# Patient Record
Sex: FEMALE | Race: WHITE | Employment: UNEMPLOYED | URBAN - METROPOLITAN AREA
[De-identification: names, ages, dates, MRNs, and addresses within clinical notes are randomized per-mention and may not be internally consistent; named-entity substitution may affect disease eponyms.]

---

## 2017-12-15 ENCOUNTER — OFFICE VISIT (OUTPATIENT)
Dept: SURGERY | Age: 22
End: 2017-12-15

## 2017-12-15 VITALS
BODY MASS INDEX: 45.99 KG/M2 | OXYGEN SATURATION: 98 % | HEIGHT: 67 IN | RESPIRATION RATE: 17 BRPM | DIASTOLIC BLOOD PRESSURE: 91 MMHG | TEMPERATURE: 97.5 F | SYSTOLIC BLOOD PRESSURE: 137 MMHG | HEART RATE: 97 BPM | WEIGHT: 293 LBS

## 2017-12-15 DIAGNOSIS — E66.01 MORBID OBESITY DUE TO EXCESS CALORIES (HCC): Primary | ICD-10-CM

## 2017-12-15 RX ORDER — PSEUDOEPHEDRINE HCL 30 MG
TABLET ORAL DAILY
COMMUNITY
End: 2021-12-06 | Stop reason: CLARIF

## 2017-12-15 RX ORDER — LITHIUM CARBONATE 300 MG
900 TABLET ORAL
Status: ON HOLD | COMMUNITY
End: 2021-12-10

## 2017-12-15 RX ORDER — METHOCARBAMOL 500 MG/1
500 TABLET, FILM COATED ORAL AS NEEDED
COMMUNITY
End: 2018-04-04

## 2017-12-15 RX ORDER — METFORMIN HYDROCHLORIDE 1000 MG/1
TABLET, FILM COATED, EXTENDED RELEASE ORAL
COMMUNITY
End: 2018-03-29

## 2017-12-15 NOTE — PROGRESS NOTES
Initial Consultation for Bariatric Surgery     Onelia Dash is a 20 y.o. female who comes into the office today for initial consultation for the surgical options for the treatment o f morbid obesity. 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 Ayaan Winter has tried a variety of unsupervised weight-loss attempts including unsupervised diets and Slim Fast, paleo, but has yet to meet with lasting success. Maximum weight lost on a diet is about 30 lbs, but that the weight always seems to return. Today, the patient is Height: 5' 7\" (170.2 cm) , Weight: 140.6 kg (310 lb) for a BMI of Body mass index is 48.55 kg/(m^2). [facility] Maximum weight is 335lbs. It is due to their severe obesity, which is further complicated by COCO (does not need a mask)  PCOS that the patient is now seeking out bariatric surgery. Weight Loss Metrics 12/15/2017 1/4/2017 10/19/2016 9/15/2016 5/13/2016 6/9/2014 5/8/2014   Today's Wt 310 lb 327 lb 6.1 oz 300 lb 312 lb 315 lb 295 lb 298 lb   BMI 48.55 kg/m2 51.28 kg/m2 46.99 kg/m2 48.87 kg/m2 49.32 kg/m2 46.19 kg/m2 46.66 kg/m2       Body mass index is 48.55 kg/(m^2).     Past Medical History:   Diagnosis Date    Chronic pain     Depression     GERD (gastroesophageal reflux disease)     Headache(784.0)     Hypercholesterolemia     IBS (irritable bowel syndrome)     Keratosis pilaris     Osteoarthrosis 4/25/2014    PCOS (polycystic ovarian syndrome)     Right bundle branch block     Sciatic nerve injury     Scoliosis     Sleep apnea     SVT (supraventricular tachycardia) (HCC)     Tailbone injury     Tricuspid valve disorder     Ruiz-Parkinson-White syndrome        Past Surgical History:   Procedure Laterality Date    HX ANGIOPLASTY  2002    HX HEART VALVE SURGERY  2000    oblation       Current Outpatient Prescriptions   Medication Sig Dispense Refill    metFORMIN (GLUMETZA) 1,000 mg TG24 24 hour tablet Take  by mouth.  methocarbamol (ROBAXIN) 500 mg tablet Take  by mouth four (4) times daily.  lithium carbonate 300 mg tablet Take 300 mg by mouth three (3) times daily.  calcium citrate tab tablet Take  by mouth daily.  LEVONORGESTREL-ETHIN ESTRADIOL (AVIANE PO) Take  by mouth.  ergocalciferol (ERGOCALCIFEROL) 50,000 unit capsule Take 1 Cap by mouth every seven (7) days. 4 Cap 3    lamoTRIgine (LAMICTAL) 200 mg tablet Take 200 mg by mouth daily.  clonazePAM (KLONOPIN) 0.5 mg tablet Take 0.5 mg by mouth once.  DULoxetine (CYMBALTA) 20 mg capsule Take 120 mg by mouth daily.  loratadine (CLARITIN) 10 mg tablet Take 1 Tab by mouth daily. 30 Tab 3       Allergies   Allergen Reactions    Abilify [Aripiprazole] Anaphylaxis    Dilaudid [Hydromorphone (Bulk)] Itching     Pt reports she became delusion after getting this med.     Hydrocodone Itching     All the hydrocodones      Other Medication Hives     Polyester     Roxicet [Oxycodone-Acetaminophen] Itching    Vicodin [Hydrocodone-Acetaminophen] Itching       Social History   Substance Use Topics    Smoking status: Never Smoker    Smokeless tobacco: Never Used    Alcohol use No       Family History   Problem Relation Age of Onset    Cancer Maternal Grandmother     Seizures Maternal Grandmother     Migraines Maternal Grandmother     Arthritis-osteo Maternal Grandmother     Arthritis-rheumatoid Maternal Grandmother     Thyroid Disease Maternal Grandmother     Lung Disease Maternal Grandmother     High Cholesterol Mother     Hypertension Mother     Elevated Lipids Mother     Heart Disease Father     Psychiatric Disorder Father     Hypertension Father     Diabetes Father     Cancer Paternal Grandmother        ROS, positive where in bold:    General: fevers, chills, night sweats, fatigue, weight loss, weight gain    GI: abdominal pain, nausea, vomiting, change in bowel habits, hematochezia, melena, GERD, constipation, IBS (diarrhea dominant)    Integumentary: dermatitis or abnormal moles.     HEENT:  visual changes, vertigo, epistaxis, dysphagia, hoarseness    Cardiac: chest pain, palpitations, hypertension, edema, varicosities    Resp:  cough, shortness of breath, wheezing, hemoptysis, snoring, reactive airway disease    : hematuria, dysuria, frequency, urgency, nocturia, stress urinary incontinence    MSK: weakness, joint pain, arthritis    Endocrine: diabetes, thyroid disease, polyuria, polydipsia, polyphagia, poor wound healing, heat intolerance, cold intolerance    Lymph/Heme: anemia, bruising, history of blood transfusions    Neuro: dizziness, headache, fainting, seizures, stroke    Psychiatry:  anxiety, depression and bipolar disorder, psychosis      Physical Exam:  Visit Vitals    BP (!) 137/91 (BP 1 Location: Right arm, BP Patient Position: Sitting)    Pulse 97    Temp 97.5 °F (36.4 °C) (Oral)    Resp 17    Ht 5' 7\" (1.702 m)    Wt 140.6 kg (310 lb)    SpO2 98%    BMI 48.55 kg/m2       General: Well developed, well nourished 20 y.o. female in no acute distress  ENMT: normocephalic, atraumatic mouth:clear, no overt lesions, oral mucosa pink and moist  Neck: supple, no masses, no adenopathy or carotid bruits, trachea midline  Skin: warm, smooth, dry and well perfused  Respiratory: clear to auscultation bilaterally, no wheeze, rhonchi or rales, excursions normal and symmetrical  Cardiovascular: Regular rate and rhythm, no murmurs, clicks, gallops or rubs, no edema or varicosities  Gastrointestinal: soft, nontender, nondistended, normoactive bowel sounds, no hernias, no hepatosplenomegaly, minimally palpable costal margins, mixed distribution  Musculoskeletal: warm, well-perfused, no tenderness or swelling, normal gait/station  Neuro: sensation and strength grossly intact and symmetrical  Psych: alert and oriented to person, place and time      Impression:    Shayla Medina is a 20 y.o. female who is suffering from morbid obesity and its attendant comorbidities who would benefit from bariatric surgery. We have had an extensive discussion with regard to the risks, benefits and likely outcomes of both the sleeve and the gastric bypass. With regard to bypass, we have discussed the risks including bleeding, infection, need for reoperation, damage to surrounding structure, anastomotic leak, gastrogastric fistula ulcer and stricture, bowel obstruction due to internal hernia or adhesions, DVT, PE, heart attack, stroke and death. Patient also understands risks of inadequate weight loss, excess weight loss, vitamin insufficiency, protein malnutrition, excess skin, and loss of hair. We've discussed the restrictive nature of the sleeve gastrectomy. The patient understands the likelihood of losing approximately 65% of their excess weight in 12 to 18 months. Patient also understands the risks including but not limited to bleeding, infection, need for reoperation, leaks from staple line and strictures, bowel obstruction secondary to adhesions, DVT, PE, heart attack, stroke, and death. Patient also understands risks of inadequate weight loss, excess weight loss, vitamin insufficiency, protein malnutrition, excess skin, and loss of hair. We have reviewed the components of a successful postoperative course including requirement for a high protein, low carbohydrate diet, 60 oz a day of zero calorie liquids, daily vitamin supplementation, daily exercise, regular follow-up, and participation in support groups. At this time we will enroll the patient in our bariatric program, undertake routine laboratory and radiographic evaluation, EKG, evaluation by nutritionist as well as psychologist and pending their satisfactory completion of the preop evaluation, plan to perform sleeve gastrectomy.

## 2017-12-15 NOTE — PATIENT INSTRUCTIONS
If you have any questions or concerns about today's appointment, the verbal and/or written instructions you were given for follow up care, please call our office at [phone].     [facility] - [address]    [phone] office  [phone]

## 2017-12-20 LAB
UREA BREATH TEST QL: NEGATIVE
UREA BREATH TEST QL: NORMAL

## 2017-12-21 ENCOUNTER — DOCUMENTATION ONLY (OUTPATIENT)
Dept: SURGERY | Age: 22
End: 2017-12-21

## 2017-12-21 NOTE — PROGRESS NOTES
[facility]  [address], [facility]    Patient's Name: Arben Bocanegra                                  Age: 20 y.o. YOB: 1995                                          Sex: female  Date: 12/20/2017  Insurance: [facility]                          Session: 1 of 3               Surgeon:  Dr. Leann Cortez    Height: 5'7\"                    Weight: 311#           Starting weight with surgeon: 310#          Do you smoke?: no    Alcohol Intake:   I do not drink at all:x      Class Guidelines    Pt. Understand that if they miss a month, depending on insurance, they may have to start over. Pt. Also understand that the expectation is to lose  Or maintain weight. Weight gain may result in delaying surgery until the weight is off. EATING HABITS AND BEHAVIORS:  Pt. Struggles With:  Liquid calories: x  Skipping breakfast:   Eating foods with a high amount of carbohydrates: x  Eating High fat foods:   Eating large portions:   Making poor snack choices:  Eating out frequently: x  Skipping meals:   Reading labels:   Drinking >48 oz fluids daily:   Getting sufficient physical activity/exercise: x  Night time eating/snacking:   Binge eating:   Eating often, even when not hungry (grazing):   Giving into peer pressure regarding eating/drinking:   Other:     Each class we spend time discussing the pre-op diet, diet progression and vitamin/mineral requirements as well as the Key Diet Principles. Each class we also cover lowering carbohydrate consumption. Keeping carbohydrates <25 grams per meal and avoiding added sugars is emphasized. Patient is educated on the effects that  carbohydrates and bad fats have on them. PHYSICAL ACTIVITY/EXERCISE:   Patient's activity is increasing because patient plans to or is:  Walking more: x  Other aerobic activity such as running, swimming, Irene, kickboxing ect.:   Chair exercises:    Active in resistance training such as weight lifting:   Other:     Each class we spend time discussing the importance of increasing activity. Patient can start with 10 minutes of walking daily or chair exercises and build from there. BEHAVIOR MODIFICATION:  Making modifications to behavior, patient plans to or is:  Eating only when hungry not to treat stress, anger, tiredness or boredom:   Eating away from TV, computer and phone:   Eating on a small plate: x  Eats slowly, chewing food well: x  Other: We spend time in each class discussing the importance of breaking bad habits and how to do this. I encourage pt.s to self evaluate and look for those crucial moments in which they are making these poor choices. I recommend a food journal which can help identify when/where//why/who we are with when we compromise and make exceptions that are poor choices. ADDITIONAL INFORMATION:  Specific changes patient made since the last class:  1st class. Pt. Plans to eat smaller portions, cut carbs, drink more water.     Shaun Adler, RD  12/20/2017

## 2018-01-24 ENCOUNTER — HOSPITAL ENCOUNTER (OUTPATIENT)
Dept: GENERAL RADIOLOGY | Age: 23
Discharge: HOME | End: 2018-01-24
Attending: SURGERY
Payer: COMMERCIAL

## 2018-01-24 DIAGNOSIS — E66.01 MORBID OBESITY DUE TO EXCESS CALORIES (HCC): ICD-10-CM

## 2018-01-24 PROCEDURE — 74011000255 HC RX REV CODE- 255: Performed by: SURGERY

## 2018-01-24 PROCEDURE — 74011000250 HC RX REV CODE- 250: Performed by: SURGERY

## 2018-01-24 PROCEDURE — 74247 XR UPPER GI W KUB AIR CONT: CPT

## 2018-01-24 RX ADMIN — BARIUM SULFATE 135 ML: 980 POWDER, FOR SUSPENSION ORAL at 12:45

## 2018-01-24 RX ADMIN — ANTACID/ANTIFLATULENT 4 G: 380; 550; 10; 10 GRANULE, EFFERVESCENT ORAL at 12:45

## 2018-01-24 RX ADMIN — BARIUM SULFATE 176 G: 960 POWDER, FOR SUSPENSION ORAL at 12:45

## 2018-01-31 ENCOUNTER — DOCUMENTATION ONLY (OUTPATIENT)
Dept: SURGERY | Age: 23
End: 2018-01-31

## 2018-01-31 NOTE — PROGRESS NOTES
[facility]  [address] [facility], [address]    Patient's Name: Alonzo Campo                                  Age: 20 y.o. YOB: 1995                                          Sex: female  Date: 01/31/2018  Insurance: [facility]                          Session: 2 of 3               Surgeon:  Dr. Kae Plasencia    Height: 5'7\"                    Weight: 307#           Starting weight with surgeon: 310#          Do you smoke?: no    Alcohol Intake:   I do not drink at all:x      Class Guidelines    Pt. Understand that if they miss a month, depending on insurance, they may have to start over. Pt. Also understand that the expectation is to lose  Or maintain weight. Weight gain may result in delaying surgery until the weight is off. EATING HABITS AND BEHAVIORS:  Pt. Struggles With:  Liquid calories:   Skipping breakfast:   Eating foods with a high amount of carbohydrates:   Eating High fat foods:   Eating large portions:   Making poor snack choices:  Eating out frequently:   Skipping meals:   Reading labels:   Drinking >48 oz fluids daily: x  Getting sufficient physical activity/exercise: x  Night time eating/snacking:   Binge eating:   Eating often, even when not hungry (grazing):   Giving into peer pressure regarding eating/drinking:   Other:     Each class we spend time discussing the pre-op diet, diet progression and vitamin/mineral requirements as well as the Key Diet Principles. Each class we also cover lowering carbohydrate consumption. Keeping carbohydrates <25 grams per meal and avoiding added sugars is emphasized. Patient is educated on the effects that  carbohydrates and bad fats have on them. PHYSICAL ACTIVITY/EXERCISE:   Patient's activity is increasing because patient plans to or is:  Walking more: Other aerobic activity such as running, swimming, Irene, kickboxing ect.:   Chair exercises:    Active in resistance training such as weight lifting:   Other:     Each class we spend time discussing the importance of increasing activity. Patient can start with 10 minutes of walking daily or chair exercises and build from there. BEHAVIOR MODIFICATION:  Making modifications to behavior, patient plans to or is:  Eating only when hungry not to treat stress, anger, tiredness or boredom: working on walking. Eating away from TV, computer and phone:   Eating on a small plate: x  Eats slowly, chewing food well: Other: We spend time in each class discussing the importance of breaking bad habits and how to do this. I encourage pt.s to self evaluate and look for those crucial moments in which they are making these poor choices. I recommend a food journal which can help identify when/where//why/who we are with when we compromise and make exceptions that are poor choices. ADDITIONAL INFORMATION:  Specific changes patient made since the last class:  Not eating at computer, drinking more water, cutting out caffeine.     Yvette Vega, RD  01/31/2018

## 2018-02-02 NOTE — PROGRESS NOTES
Given the degree of reflux and presence of hiatal hernia, I feel she may be best served with bypass. Please notify patient and I will discuss iwht her at our next appointment or sooner if she wishes.

## 2018-02-05 ENCOUNTER — TELEPHONE (OUTPATIENT)
Dept: SURGERY | Age: 23
End: 2018-02-05

## 2018-02-05 NOTE — TELEPHONE ENCOUNTER
----- Message from Law Cody MD sent at 2/2/2018  2:08 PM EST -----  Given the degree of reflux and presence of hiatal hernia, I feel she may be best served with bypass. Please notify patient and I will discuss iwht her at our next appointment or sooner if she wishes.

## 2018-02-05 NOTE — TELEPHONE ENCOUNTER
Patient return my call.  Inform her of results and patient also stated she will go today to have labs done prior to appt on Feb 8th

## 2018-02-05 NOTE — TELEPHONE ENCOUNTER
lvm for pt to return my call regarding her UGI results.  Patient has an appt to come and see Dr. Josephine Pappas on Feb 8th

## 2018-02-06 ENCOUNTER — HOSPITAL ENCOUNTER (OUTPATIENT)
Dept: PREADMISSION TESTING | Age: 23
Discharge: HOME | End: 2018-02-06
Payer: COMMERCIAL

## 2018-02-06 DIAGNOSIS — E66.01 MORBID OBESITY DUE TO EXCESS CALORIES (HCC): ICD-10-CM

## 2018-02-06 LAB
FERRITIN SERPL-MCNC: 62 NG/ML (ref 8–388)
FOLATE SERPL-MCNC: 19.4 NG/ML (ref 3.1–17.5)
IRON SERPL-MCNC: 43 UG/DL (ref 50–175)
VIT B12 SERPL-MCNC: 293 PG/ML (ref 211–911)

## 2018-02-06 PROCEDURE — 82728 ASSAY OF FERRITIN: CPT | Performed by: SURGERY

## 2018-02-06 PROCEDURE — 83540 ASSAY OF IRON: CPT | Performed by: SURGERY

## 2018-02-06 PROCEDURE — 36415 COLL VENOUS BLD VENIPUNCTURE: CPT | Performed by: SURGERY

## 2018-02-06 PROCEDURE — 82746 ASSAY OF FOLIC ACID SERUM: CPT | Performed by: SURGERY

## 2018-02-06 PROCEDURE — 84425 ASSAY OF VITAMIN B-1: CPT | Performed by: SURGERY

## 2018-02-08 ENCOUNTER — OFFICE VISIT (OUTPATIENT)
Dept: SURGERY | Age: 23
End: 2018-02-08

## 2018-02-08 VITALS
DIASTOLIC BLOOD PRESSURE: 95 MMHG | BODY MASS INDEX: 45.99 KG/M2 | RESPIRATION RATE: 18 BRPM | HEIGHT: 67 IN | HEART RATE: 108 BPM | OXYGEN SATURATION: 98 % | SYSTOLIC BLOOD PRESSURE: 136 MMHG | WEIGHT: 293 LBS | TEMPERATURE: 96 F

## 2018-02-08 DIAGNOSIS — E66.01 MORBID OBESITY DUE TO EXCESS CALORIES (HCC): Primary | ICD-10-CM

## 2018-02-08 LAB — VIT B1 BLD-SCNC: 175 NMOL/L (ref 66.5–200)

## 2018-02-08 NOTE — PATIENT INSTRUCTIONS
If you have any questions or concerns about today's appointment, the verbal and/or written instructions you were given for follow up care, please call our office at [phone].     [facility] - [address]    [phone] office  [phone]

## 2018-02-08 NOTE — PROGRESS NOTES
Bariatric Preoperative Progress note:    Subjective:     Heraclio Ang is a 20 y.o. female who presents today for followup of their candidacy for bariatric surgery. Since last seen, has been working on dietary classes. She is working to increase her exercise, but she notes that it causes pain in her legs. She is eating more lean protein and veggies. She is still struggling with finding dressing she finds palatable. Past Medical History:   Diagnosis Date    Chronic pain     Depression     GERD (gastroesophageal reflux disease)     Headache(784.0)     Hypercholesterolemia     IBS (irritable bowel syndrome)     Keratosis pilaris     Osteoarthrosis 4/25/2014    PCOS (polycystic ovarian syndrome)     Right bundle branch block     Sciatic nerve injury     Scoliosis     Sleep apnea     SVT [facility]supraventricular tachycardia) ([facility])     Tailbone injury     Tricuspid valve disorder     Ruiz-Parkinson-White syndrome        Past Surgical History:   Procedure Laterality Date    HX ANGIOPLASTY  2002    HX HEART VALVE SURGERY  2000    oblation       Current Outpatient Prescriptions   Medication Sig Dispense Refill    PEDIATRIC MULTIVITAMIN NO.49 (FLINTSTONES GUMMIES PO) Take  by mouth.  SPIRONOLACTONE PO Take  by mouth.  metFORMIN (GLUMETZA) 1,000 mg TG24 24 hour tablet Take  by mouth.  methocarbamol (ROBAXIN) 500 mg tablet Take  by mouth four (4) times daily.  lithium carbonate 300 mg tablet Take 300 mg by mouth three (3) times daily.  LEVONORGESTREL-ETHIN ESTRADIOL (AVIANE PO) Take  by mouth.  lamoTRIgine (LAMICTAL) 200 mg tablet Take 200 mg by mouth daily.  clonazePAM (KLONOPIN) 0.5 mg tablet Take 0.5 mg by mouth once.  DULoxetine (CYMBALTA) 20 mg capsule Take 120 mg by mouth daily.  calcium citrate tab tablet Take  by mouth daily.  ergocalciferol (ERGOCALCIFEROL) 50,000 unit capsule Take 1 Cap by mouth every seven (7) days.  4 Cap 3    loratadine (CLARITIN) 10 mg tablet Take 1 Tab by mouth daily. 30 Tab 3       Allergies   Allergen Reactions    Abilify [Aripiprazole] Anaphylaxis    Dilaudid [Hydromorphone (Bulk)] Itching     Pt reports she became delusion after getting this med.     Hydrocodone Itching     All the hydrocodones      Other Medication Hives     Polyester     Roxicet [Oxycodone-Acetaminophen] Itching    Vicodin [Hydrocodone-Acetaminophen] Itching         Objective:     Physical Exam:  Visit Vitals    BP (!) 136/95 (BP 1 Location: Right arm, BP Patient Position: Sitting)    Pulse (!) 108    Temp 96 °F (35.6 °C) (Oral)    Resp 18    Ht 5' 7\" (1.702 m)    Wt 140.8 kg (310 lb 6.4 oz)    LMP 02/07/2018    SpO2 98%    BMI 48.62 kg/m2       General: Well developed, well nourished 20 y.o. female in no acute distress  ENMT: normocephalic, atraumatic mouth:clear, no overt lesions, oral mucosa pink and moist  Neck: supple, no masses, no adenopathy or carotid bruits, trachea midline  Skin: warm, smooth, dry and well perfused  Respiratory: clear to auscultation bilaterally, no wheezes, rhonchi or rales, excursions normal and symmetrical  Cardiovascular: Regular rate and rhythm, no murmurs, clicks, gallops or rubs, no edema or varicosities  Gastrointestinal: soft, nontender, nondistended, normoactive bowel sounds, no hernias, no hepatosplenomegaly  Musculoskeletal: warm, well-perfused, no tenderness or swelling, normal gait/station  Neuro: sensation and strength grossly intact and symmetrical  Psych: alert and oriented to person, place and time      Studies to date:    Labs: significant for elevated cholesterol and LDL    Nutritional evaluation: ongoing    UGI: small hiatal hernia and GERD to upper esophagus    Psychiatric evaluation:pending repeat eval in March    Seen by cardiologist: she is at mildly increased risk for surgery, but stable for surgery      Assessment:   Onelia Meza is a 20 y.o. female who is progressing through the bariatric preoperative evaluation. At this time, they are an appropriate candidate for weight loss surgery. We have discussed worsening of reflux possibli with sleeve given her hital hernia and reflux and she has elected to have a gastric bypass instead. Plan:   -complete remainder of preop evaluation including remaining diet classes, labs and followup with Dr Richie Brown  She understands that she may require adjustments to her medication regimen as a result of changes in her hormones.   She is in good communication with her therapist regarding this.  -Follow up once has completed the above to determine next steps

## 2018-02-08 NOTE — PROGRESS NOTES
Leonora Meraz is a 20 y.o. female who presents for a surgical evaluation of gastric bypass. Body mass index is 48.62 kg/(m^2). 1. Have you been to the ER, urgent care clinic since your last visit? Hospitalized since your last visit? No    2. Have you seen or consulted any other health care providers outside of the [address] [place] [place] [address] since your last visit? Include any pap smears or colon screening.  No

## 2018-02-08 NOTE — MR AVS SNAPSHOT
Migdalia Head 
 
 
 [address] [facility] [address] [address] 
[phone] Patient: Luther Rojas MRN: [id number] :1995 Visit Information Date & Time Provider Department Dept. Phone Encounter #  
 2018 11:45 AM MD Lanette Harden [phone] [id number] Your Appointments 2018  3:00 PM  
NUTRITION COUNSELING with [facility] DIETICIAN Lanette Wilks ([address] [place] [address] Road) Appt Note: 3 of 3  
 [address] [facility] [address]  
  
   
 [address] [facility] Daniel De Gasperi [address] 3/19/2018  9:00 AM  
GASTRIC BYPASS VISIT with [facility] Jennifergiovanny Jackelin 92Sammi Lashaun ([address] [place], [place] [address]er Road) Appt Note: pre-op class sx 18 Dr. Jessika Verdugo [address] [facility] [address]  
  
   
 [id number] [place] [address] Daniel De Gasperi [address] 2018  3:15 PM  
POST OP with Saulo Wesley MD  
92Sammi Wilks [address] [place] [address] Road) Appt Note: post op sx 18  
 [address] [facility] [address] [place] [address] Kenzie Daniel De Gasperi [address]  
  
    
 2018  2:30 PM  
NUTRITION COUNSELING with [facility] DIETICIAN WaleSammi Wilks ([address] [place] [address]) Appt Note: post op nutrient sx 18  
 [address] [address] [address]  
[phone] Upcoming Health Maintenance Date Due  
 HPV AGE 9Y-34Y (1 of 3 - Female 3 Dose Series) 2005 DTaP/Tdap/Td series (1 - Tdap) 2015 PAP AKA CERVICAL CYTOLOGY 2015 Influenza Age 5 to Adult 2017 Allergies as of 2018  Review Complete On: 2018 By: Mamie Pradhan LPN Severity Noted Reaction Type Reactions Abilify [Aripiprazole] High 09/15/2016    Anaphylaxis Dilaudid [Hydromorphone (Bulk)]  09/15/2016    Itching Pt reports she became delusion after getting this med. Hydrocodone  01/30/2013    Itching All the hydrocodones Other Medication  04/17/2013    Hives Polyester Roxicet [Oxycodone-acetaminophen]  03/29/2013    Itching Vicodin [Hydrocodone-acetaminophen]  01/30/2013    Itching Current Immunizations  Never Reviewed Name Date Pneumococcal Conjugate (PCV-13) 1/4/2017  1:38 PM  
  
 Not reviewed this visit You Were Diagnosed With   
  
 Codes Comments Morbid obesity due to excess calories ([facility]    -  Primary ICD-10-CM: E66.01 
ICD-9-CM: 278.01 Vitals BP Pulse Temp Resp Height(growth percentile) Weight(growth percentile) (!) 136/95 (BP 1 Location: Right arm, BP Patient Position: Sitting) (!) 108 96 °F (35.6 °C) (Oral) 18 5' 7\" (1.702 m) 310 lb 6.4 oz (140.8 kg) LMP SpO2 BMI OB Status Smoking Status 02/07/2018 98% 48.62 kg/m2 Polycystic Ovarian Syndrome Never Smoker BMI and BSA Data Body Mass Index Body Surface Area  
 48.62 kg/m 2 2.58 m 2 Preferred Pharmacy Pharmacy Name Phone [address], [facility] [phone] Your Updated Medication List  
  
   
This list is accurate as of: 2/8/18  1:18 PM.  Always use your most recent med list.  
  
  
  
  
 Manus Shell Take  by mouth.  
  
 calcium citrate Tab tablet Take  by mouth daily. DULoxetine 20 mg capsule Commonly known as:  CYMBALTA Take 120 mg by mouth daily. ergocalciferol 50,000 unit capsule Commonly known as:  ERGOCALCIFEROL Take 1 Cap by mouth every seven (7) days. FLINTSTONES GUMMIES PO Take  by mouth. KlonoPIN 0.5 mg tablet Generic drug:  clonazePAM  
Take 0.5 mg by mouth once. LaMICtal 200 mg tablet Generic drug:  lamoTRIgine Take 200 mg by mouth daily. lithium carbonate 300 mg tablet Take 300 mg by mouth three (3) times daily. loratadine 10 mg tablet Commonly known as:  Jd Thanh Take 1 Tab by mouth daily. metFORMIN 1,000 mg Tg24 24 hour tablet Commonly known as:  Lopez Puff Take  by mouth. ROBAXIN 500 mg tablet Generic drug:  methocarbamol Take  by mouth four (4) times daily. SPIRONOLACTONE PO Take  by mouth. Patient Instructions If you have any questions or concerns about today's appointment, the verbal and/or written instructions you were given for follow up care, please call our office at [phone]. [facility] - [address], [address] 
 
[phone] office [phone] Introducing [facility]! Dear Derik Brice: 
Thank you for requesting a Coverity account. Our records indicate that you already have an active Coverity account. You can access your account anytime at [email or web address] Did you know that you can access your hospital and ER discharge instructions at any time in Coverity? You can also review all of your test results from your hospital stay or ER visit. Additional Information If you have questions, please visit the Frequently Asked Questions section of the Coverity website at [email or web address] Remember, Coverity is NOT to be used for urgent needs. For medical emergencies, dial 911. Now available from your iPhone and Android! Please provide this summary of care documentation to your next provider. Your primary care clinician is listed as Leda Bateman. If you have any questions after today's visit, please call [phone].

## 2018-02-21 ENCOUNTER — DOCUMENTATION ONLY (OUTPATIENT)
Dept: SURGERY | Age: 23
End: 2018-02-21

## 2018-02-22 ENCOUNTER — DOCUMENTATION ONLY (OUTPATIENT)
Dept: SURGERY | Age: 23
End: 2018-02-22

## 2018-02-22 NOTE — PROGRESS NOTES
[facility]  [address] [facility], [address]    Patient's Name: Suze Estrada                                  Age: 20 y.o. YOB: 1995                                          Sex: female  Date: 02/31/2018  Insurance: [facility]                          Session: 3 of 3               Surgeon:  Dr. Jonathan Mott    Height: 5'9\"                    Weight: 305#           Starting weight with surgeon: 310#          Do you smoke?: no    Alcohol Intake:   I do not drink at all:x      Class Guidelines    Pt. Understand that if they miss a month, depending on insurance, they may have to start over. Pt. Also understand that the expectation is to lose  Or maintain weight. Weight gain may result in delaying surgery until the weight is off. EATING HABITS AND BEHAVIORS:  Pt. Struggles With:  Liquid calories:   Skipping breakfast:   Eating foods with a high amount of carbohydrates: x  Eating High fat foods:   Eating large portions:   Making poor snack choices:  Eating out frequently:   Skipping meals:   Reading labels:   Drinking >48 oz fluids daily:   Getting sufficient physical activity/exercise: x  Night time eating/snacking:   Binge eating:   Eating often, even when not hungry (grazing):   Giving into peer pressure regarding eating/drinking:   Other:     Each class we spend time discussing the pre-op diet, diet progression and vitamin/mineral requirements as well as the Key Diet Principles. Each class we also cover lowering carbohydrate consumption. Keeping carbohydrates <25 grams per meal and avoiding added sugars is emphasized. Patient is educated on the effects that  carbohydrates and bad fats have on them.       PHYSICAL ACTIVITY/EXERCISE:   Patient's activity is increasing because patient plans to or is:  Walking more: x  Other aerobic activity such as running, swimming, Irene, kickboxing ect.:   Chair exercises: x  Active in resistance training such as weight lifting:   Other:     Each class we spend time discussing the importance of increasing activity. Patient can start with 10 minutes of walking daily or chair exercises and build from there. BEHAVIOR MODIFICATION:  Making modifications to behavior, patient plans to or is:  Eating only when hungry not to treat stress, anger, tiredness or boredom: x  Eating away from TV, computer and phone: x  Eating on a small plate: x  Eats slowly, chewing food well: x  Other: We spend time in each class discussing the importance of breaking bad habits and how to do this. I encourage pt.s to self evaluate and look for those crucial moments in which they are making these poor choices. I recommend a food journal which can help identify when/where//why/who we are with when we compromise and make exceptions that are poor choices. ADDITIONAL INFORMATION:  Specific changes patient made since the last class:  I cut out all liquid calories, doing more exercises, smaller portions, veggies over carbs! Better food choices. RD's thoughts:  Patient is doing well. She has a very supportive fiancee. She has many diet changed she is actively working on. Although I would have desired more weight loss, patient has faithfully attended classes and has been an active participant. She is cleared from a nutritional perspective.          Tiana Escobedo RD  02/21/2018

## 2018-03-06 ENCOUNTER — TELEPHONE (OUTPATIENT)
Dept: SURGERY | Age: 23
End: 2018-03-06

## 2018-03-06 NOTE — TELEPHONE ENCOUNTER
Spoke with patient in regards to labs per Lizzy's note she is to take iron supplement 60-65mg iron BID with Vitamin C for better absorption. Relayed information to patient, patient verbalized understanding with no further questions or concerns.

## 2018-03-19 ENCOUNTER — DOCUMENTATION ONLY (OUTPATIENT)
Dept: SURGERY | Age: 23
End: 2018-03-19

## 2018-03-19 NOTE — PROGRESS NOTES
Patient has been educated on the pre-op, post-op diets as well as the vitamin, mineral, protein supplements. She completed a quiz with accuracy.

## 2018-03-21 ENCOUNTER — OFFICE VISIT (OUTPATIENT)
Dept: SURGERY | Age: 23
End: 2018-03-21

## 2018-03-21 VITALS
SYSTOLIC BLOOD PRESSURE: 145 MMHG | WEIGHT: 293 LBS | RESPIRATION RATE: 16 BRPM | TEMPERATURE: 96.1 F | BODY MASS INDEX: 45.99 KG/M2 | DIASTOLIC BLOOD PRESSURE: 100 MMHG | OXYGEN SATURATION: 98 % | HEART RATE: 105 BPM | HEIGHT: 67 IN

## 2018-03-21 DIAGNOSIS — E66.01 MORBID OBESITY DUE TO EXCESS CALORIES (HCC): Primary | ICD-10-CM

## 2018-03-21 NOTE — PATIENT INSTRUCTIONS
If you have any questions or concerns about today's appointment, the verbal and/or written instructions you were given for follow up care, please call our office at [phone].     [facility] - [address]    [phone] office  [phone]

## 2018-03-21 NOTE — PROGRESS NOTES
Maite Trujillo is a 20 y.o. female who comes into the office today after completing the entirety of the bariatric preoperative protocol satisfactorily. 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 She has tried a variety of unsupervised weight-loss attempts, but has yet to meet with lasting success. Today, the patient is Height: 5' 7\" (170.2 cm) tall, Weight: 140.2 kg (309 lb) lbs for a Body mass index is 48.4 kg/(m^2). It is due to their severe obesity, which is further complicated by obstructive sleep apnea - clinical  that the patient is now seeking out bariatric surgery, specifically, the gastric bypass. Past Medical History:   Diagnosis Date    Chronic pain     Depression     GERD (gastroesophageal reflux disease)     Headache(784.0)     Hypercholesterolemia     IBS (irritable bowel syndrome)     Keratosis pilaris     Osteoarthrosis 4/25/2014    PCOS (polycystic ovarian syndrome)     Right bundle branch block     Sciatic nerve injury     Scoliosis     Sleep apnea     [facility])     Tailbone injury     Tricuspid valve disorder     Ruiz-Parkinson-White syndrome        Past Surgical History:   Procedure Laterality Date    HX ANGIOPLASTY  2002    HX HEART VALVE SURGERY  2000    oblation       Current Outpatient Prescriptions   Medication Sig Dispense Refill    PEDIATRIC MULTIVITAMIN NO.49 (FLINTSTONES GUMMIES PO) Take  by mouth.  SPIRONOLACTONE PO Take  by mouth.  metFORMIN (GLUMETZA) 1,000 mg TG24 24 hour tablet Take  by mouth.  methocarbamol (ROBAXIN) 500 mg tablet Take  by mouth four (4) times daily.  lithium carbonate 300 mg tablet Take 300 mg by mouth three (3) times daily.  calcium citrate tab tablet Take  by mouth daily.  LEVONORGESTREL-ETHIN ESTRADIOL (AVIANE PO) Take  by mouth.       ergocalciferol (ERGOCALCIFEROL) 50,000 unit capsule Take 1 Cap by mouth every seven (7) days. 4 Cap 3    lamoTRIgine (LAMICTAL) 200 mg tablet Take 200 mg by mouth daily.  clonazePAM (KLONOPIN) 0.5 mg tablet Take 0.5 mg by mouth once.  DULoxetine (CYMBALTA) 20 mg capsule Take 120 mg by mouth daily.  loratadine (CLARITIN) 10 mg tablet Take 1 Tab by mouth daily. 30 Tab 3       Allergies   Allergen Reactions    Abilify [Aripiprazole] Anaphylaxis    Dilaudid [Hydromorphone (Bulk)] Itching     Pt reports she became delusion after getting this med.     Hydrocodone Itching     All the hydrocodones      Other Medication Hives     Polyester     Roxicet [Oxycodone-Acetaminophen] Itching    Vicodin [Hydrocodone-Acetaminophen] Itching       Social History   Substance Use Topics    Smoking status: Never Smoker    Smokeless tobacco: Never Used    Alcohol use No       Family History   Problem Relation Age of Onset    Cancer Maternal Grandmother     Seizures Maternal Grandmother     Migraines Maternal Grandmother     Arthritis-osteo Maternal Grandmother     Arthritis-rheumatoid Maternal Grandmother     Thyroid Disease Maternal Grandmother     Lung Disease Maternal Grandmother     High Cholesterol Mother     Hypertension Mother     Elevated Lipids Mother     Heart Disease Father     Psychiatric Disorder Father     Hypertension Father     Diabetes Father     Cancer Paternal Grandmother          ROS, positive where in bold:    General: fevers, chills, night sweats, fatigue, weight loss, weight gain    GI: abdominal pain, nausea, vomiting, change in bowel habits, hematochezia, melena, GERD    Integumentary: dermatitis or abnormal moles    HEENT:  visual changes, vertigo, epistaxis, dysphagia, hoarseness    Cardiac: chest pain, palpitations, hypertension, edema,  varicosities    Resp:  cough, shortness of breath, wheezing, hemoptysis, snoring, reactive airway disease    : hematuria, dysuria, frequency, urgency, nocturia, stress urinary incontinence    MSK: weakness, joint pain, arthritis, sciatica    Endocrine: diabetes, thyroid disease, polyuria, polydipsia, polyphagia, poor wound healing, heat intolerance, cold intolerance    Lymph/Heme: anemia, bruising, history of blood transfusions    Neuro: dizziness, headache, fainting, seizures, stroke    Psychiatry:  Anxiety, depression, bipolar d/o, borderline personality disorder psychosis      Physical Exam:  Visit Vitals    BP (!) 145/100 (BP 1 Location: Right arm, BP Patient Position: Sitting)    Pulse (!) 105    Temp 96.1 °F (35.6 °C) (Oral)    Resp 16    Ht 5' 7\" (1.702 m)    Wt 140.2 kg (309 lb)    SpO2 98%    BMI 48.4 kg/m2       General: Well developed, well nourished 20 y.o. female in no acute distress  ENMT: normocephalic, atraumatic mouth:clear, no overt lesions, oral mucosa pink and moist  Neck: supple, no masses, no adenopathy or carotid bruits, trachea midline  Skin: warm, smooth, dry and well perfused  Respiratory: clear to auscultation bilaterally, no wheeze, rhonchi or rales, excursions normal and symmetrical  Cardiovascular: Regular rate and rhythm, no murmurs, clicks, gallops or rubs, no edema or varicosities  Gastrointestinal: soft, nontender, nondistended, normoactive bowel sounds, no hernias, no hepatosplenomegaly, easily palpable costal margins,  mixed distribution  Musculoskeletal: warm, well-perfused, no tenderness or swelling, normal gait/station  Neuro: sensation and strength grossly intact and symmetrical  Psych: alert and oriented to person, place and time      Studies:    Labs: within normal limits except for elevated cholesterol, low Vit D (supplemented)    EKG: without significant abnormalities    Nutritional evaluation has deemed a good candidate for weight loss surgery    Psychiatric evaluation has deemed a good candidate for weight loss surgery    Impression:    Tyrell Steiner is a 20 y.o. female who is suffering from morbid obesity and its attendant comorbidities who would benefit from bariatric surgery. We've discussed the restrictive and malabsorptive nature of the gastric bypass. The patient understands the likelihood of losing approximately 80% of their excess weight in 12 to 18 months. The patient also understands the risks including but not limited to bleeding, infection, need for reoperation, anastomotic ulcers, leaks and strictures, bowel obstruction secondary to adhesions and internal hernias, DVT, PE, heart attack, stroke, and death. Patient also understands risks of inadequate weight loss, excess weight loss, vitamin insufficiency, protein malnutrition, excess skin, and loss of hair. We have reviewed the components of a successful postoperative course including requirement for a high protein, low carbohydrate diet, 60 oz a day of zero calorie liquids, daily vitamin supplementation, daily exercise, regular follow-up, and participation in support groups. We have reviewed the preoperative clear liquid diet, as well as reviewed any medication changes required while on the clear liquid diet. We will schedule them for laparoscopic gastric bypass in the near future.

## 2018-03-21 NOTE — PROGRESS NOTES
Patient presents today for pre-op visit. Doing well and has no concerns. 1. Have you been to the ER, urgent care clinic since your last visit? Hospitalized since your last visit? No    2. Have you seen or consulted any other health care providers outside of the [address] [place] [address] since your last visit? Include any pap smears or colon screening.  No

## 2018-03-29 RX ORDER — SPIRONOLACTONE 50 MG/1
50 TABLET, FILM COATED ORAL DAILY
COMMUNITY

## 2018-03-29 RX ORDER — MELATONIN
1000 DAILY
COMMUNITY
End: 2021-12-06 | Stop reason: CLARIF

## 2018-03-29 RX ORDER — DULOXETIN HYDROCHLORIDE 60 MG/1
120 CAPSULE, DELAYED RELEASE ORAL DAILY
COMMUNITY
End: 2021-12-06 | Stop reason: CLARIF

## 2018-04-02 ENCOUNTER — ANESTHESIA EVENT (OUTPATIENT)
Dept: SURGERY | Age: 23
DRG: 621 | End: 2018-04-02
Payer: COMMERCIAL

## 2018-04-03 ENCOUNTER — HOSPITAL ENCOUNTER (INPATIENT)
Age: 23
LOS: 1 days | Discharge: HOME | DRG: 621 | End: 2018-04-04
Attending: SURGERY | Admitting: SURGERY
Payer: COMMERCIAL

## 2018-04-03 ENCOUNTER — ANESTHESIA (OUTPATIENT)
Dept: SURGERY | Age: 23
DRG: 621 | End: 2018-04-03
Payer: COMMERCIAL

## 2018-04-03 DIAGNOSIS — G89.18 POSTOPERATIVE PAIN: Primary | ICD-10-CM

## 2018-04-03 PROBLEM — E66.01 MORBID OBESITY DUE TO EXCESS CALORIES (HCC): Status: ACTIVE | Noted: 2018-04-03

## 2018-04-03 LAB — HCG UR QL: NEGATIVE

## 2018-04-03 PROCEDURE — 77030018846 HC SOL IRR STRL H20 ICUM -A: Performed by: SURGERY

## 2018-04-03 PROCEDURE — 74011250636 HC RX REV CODE- 250/636

## 2018-04-03 PROCEDURE — 77030035048 HC TRCR ENDOSC OPTCL COVD -B: Performed by: SURGERY

## 2018-04-03 PROCEDURE — 77030036598 HC CARTDRG STPL RELD ECHELON FLX J&J -D: Performed by: SURGERY

## 2018-04-03 PROCEDURE — 77030002933 HC SUT MCRYL J&J -A: Performed by: SURGERY

## 2018-04-03 PROCEDURE — 77030016151 HC PROTCTR LNS DFOG COVD -B: Performed by: SURGERY

## 2018-04-03 PROCEDURE — 77030011640 HC PAD GRND REM COVD -A: Performed by: SURGERY

## 2018-04-03 PROCEDURE — 74011250637 HC RX REV CODE- 250/637: Performed by: ANESTHESIOLOGY

## 2018-04-03 PROCEDURE — 77030013079 HC BLNKT BAIR HGGR 3M -A: Performed by: ANESTHESIOLOGY

## 2018-04-03 PROCEDURE — 77030032490 HC SLV COMPR SCD KNE COVD -B: Performed by: SURGERY

## 2018-04-03 PROCEDURE — 0D164ZA BYPASS STOMACH TO JEJUNUM, PERCUTANEOUS ENDOSCOPIC APPROACH: ICD-10-PCS | Performed by: SURGERY

## 2018-04-03 PROCEDURE — 77030036732 HC RELD STPLR VASC J&J -F: Performed by: SURGERY

## 2018-04-03 PROCEDURE — 74011250636 HC RX REV CODE- 250/636: Performed by: SURGERY

## 2018-04-03 PROCEDURE — 77030002996 HC SUT SLK J&J -A: Performed by: SURGERY

## 2018-04-03 PROCEDURE — 77030027138 HC INCENT SPIROMETER -A

## 2018-04-03 PROCEDURE — 77030035051: Performed by: SURGERY

## 2018-04-03 PROCEDURE — 77030031139 HC SUT VCRL2 J&J -A: Performed by: SURGERY

## 2018-04-03 PROCEDURE — 77030008438 HC STPL REINF SEMGD WLGO -D: Performed by: SURGERY

## 2018-04-03 PROCEDURE — 65270000029 HC RM PRIVATE

## 2018-04-03 PROCEDURE — 77030027491 HC SHR ENDOSC COVD -B: Performed by: SURGERY

## 2018-04-03 PROCEDURE — 77030010507 HC ADH SKN DERMBND J&J -B: Performed by: SURGERY

## 2018-04-03 PROCEDURE — 77030035029 HC NDL INSUF VERES DISP COVD -B: Performed by: SURGERY

## 2018-04-03 PROCEDURE — 76210000016 HC OR PH I REC 1 TO 1.5 HR: Performed by: SURGERY

## 2018-04-03 PROCEDURE — 77030008477 HC STYL SATN SLP COVD -A: Performed by: ANESTHESIOLOGY

## 2018-04-03 PROCEDURE — 74011250637 HC RX REV CODE- 250/637: Performed by: SURGERY

## 2018-04-03 PROCEDURE — 77030035050: Performed by: SURGERY

## 2018-04-03 PROCEDURE — 77030035045 HC TRCR ENDOSC VRSPRT BLDLSS COVD -B: Performed by: SURGERY

## 2018-04-03 PROCEDURE — 74011000250 HC RX REV CODE- 250

## 2018-04-03 PROCEDURE — 77030027876 HC STPLR ENDOSC FLX PWR J&J -G1: Performed by: SURGERY

## 2018-04-03 PROCEDURE — 77030008683 HC TU ET CUF COVD -A: Performed by: ANESTHESIOLOGY

## 2018-04-03 PROCEDURE — 76060000036 HC ANESTHESIA 2.5 TO 3 HR: Performed by: SURGERY

## 2018-04-03 PROCEDURE — 74011000250 HC RX REV CODE- 250: Performed by: SURGERY

## 2018-04-03 PROCEDURE — 74011258636 HC RX REV CODE- 258/636: Performed by: SURGERY

## 2018-04-03 PROCEDURE — 81025 URINE PREGNANCY TEST: CPT

## 2018-04-03 PROCEDURE — 77030005518 HC CATH URETH FOL 2W BARD -B: Performed by: SURGERY

## 2018-04-03 PROCEDURE — 77030020254 HC SOL INJ D5LR LACTATED RINGER

## 2018-04-03 PROCEDURE — 76010000132 HC OR TIME 2.5 TO 3 HR: Performed by: SURGERY

## 2018-04-03 RX ORDER — ONDANSETRON 2 MG/ML
4 INJECTION INTRAMUSCULAR; INTRAVENOUS
Status: DISCONTINUED | OUTPATIENT
Start: 2018-04-03 | End: 2018-04-03 | Stop reason: HOSPADM

## 2018-04-03 RX ORDER — SODIUM CHLORIDE, SODIUM LACTATE, POTASSIUM CHLORIDE, CALCIUM CHLORIDE 600; 310; 30; 20 MG/100ML; MG/100ML; MG/100ML; MG/100ML
75 INJECTION, SOLUTION INTRAVENOUS CONTINUOUS
Status: DISCONTINUED | OUTPATIENT
Start: 2018-04-03 | End: 2018-04-04 | Stop reason: HOSPADM

## 2018-04-03 RX ORDER — FAMOTIDINE 20 MG/1
TABLET, FILM COATED ORAL
Status: DISPENSED
Start: 2018-04-03 | End: 2018-04-03

## 2018-04-03 RX ORDER — DEXTROSE, SODIUM CHLORIDE, SODIUM LACTATE, POTASSIUM CHLORIDE, AND CALCIUM CHLORIDE 5; .6; .31; .03; .02 G/100ML; G/100ML; G/100ML; G/100ML; G/100ML
150 INJECTION, SOLUTION INTRAVENOUS CONTINUOUS
Status: DISCONTINUED | OUTPATIENT
Start: 2018-04-03 | End: 2018-04-04 | Stop reason: HOSPADM

## 2018-04-03 RX ORDER — MIDAZOLAM HYDROCHLORIDE 1 MG/ML
INJECTION, SOLUTION INTRAMUSCULAR; INTRAVENOUS AS NEEDED
Status: DISCONTINUED | OUTPATIENT
Start: 2018-04-03 | End: 2018-04-03 | Stop reason: HOSPADM

## 2018-04-03 RX ORDER — DEXAMETHASONE SODIUM PHOSPHATE 4 MG/ML
INJECTION, SOLUTION INTRA-ARTICULAR; INTRALESIONAL; INTRAMUSCULAR; INTRAVENOUS; SOFT TISSUE AS NEEDED
Status: DISCONTINUED | OUTPATIENT
Start: 2018-04-03 | End: 2018-04-03 | Stop reason: HOSPADM

## 2018-04-03 RX ORDER — MORPHINE SULFATE 2 MG/ML
2 INJECTION, SOLUTION INTRAMUSCULAR; INTRAVENOUS
Status: DISCONTINUED | OUTPATIENT
Start: 2018-04-03 | End: 2018-04-04 | Stop reason: HOSPADM

## 2018-04-03 RX ORDER — LIDOCAINE HYDROCHLORIDE 20 MG/ML
INJECTION, SOLUTION EPIDURAL; INFILTRATION; INTRACAUDAL; PERINEURAL AS NEEDED
Status: DISCONTINUED | OUTPATIENT
Start: 2018-04-03 | End: 2018-04-03 | Stop reason: HOSPADM

## 2018-04-03 RX ORDER — NEOSTIGMINE METHYLSULFATE 5 MG/5 ML
SYRINGE (ML) INTRAVENOUS AS NEEDED
Status: DISCONTINUED | OUTPATIENT
Start: 2018-04-03 | End: 2018-04-03 | Stop reason: HOSPADM

## 2018-04-03 RX ORDER — KETOROLAC TROMETHAMINE 30 MG/ML
30 INJECTION, SOLUTION INTRAMUSCULAR; INTRAVENOUS EVERY 6 HOURS
Status: COMPLETED | OUTPATIENT
Start: 2018-04-03 | End: 2018-04-04

## 2018-04-03 RX ORDER — FENTANYL CITRATE 50 UG/ML
INJECTION, SOLUTION INTRAMUSCULAR; INTRAVENOUS AS NEEDED
Status: DISCONTINUED | OUTPATIENT
Start: 2018-04-03 | End: 2018-04-03 | Stop reason: HOSPADM

## 2018-04-03 RX ORDER — ONDANSETRON 2 MG/ML
4 INJECTION INTRAMUSCULAR; INTRAVENOUS
Status: DISCONTINUED | OUTPATIENT
Start: 2018-04-03 | End: 2018-04-04 | Stop reason: HOSPADM

## 2018-04-03 RX ORDER — ENOXAPARIN SODIUM 100 MG/ML
40 INJECTION SUBCUTANEOUS DAILY
Status: DISCONTINUED | OUTPATIENT
Start: 2018-04-04 | End: 2018-04-04 | Stop reason: HOSPADM

## 2018-04-03 RX ORDER — FAMOTIDINE 20 MG/1
20 TABLET, FILM COATED ORAL 2 TIMES DAILY
Status: DISCONTINUED | OUTPATIENT
Start: 2018-04-03 | End: 2018-04-03

## 2018-04-03 RX ORDER — SODIUM CHLORIDE, SODIUM LACTATE, POTASSIUM CHLORIDE, CALCIUM CHLORIDE 600; 310; 30; 20 MG/100ML; MG/100ML; MG/100ML; MG/100ML
50 INJECTION, SOLUTION INTRAVENOUS CONTINUOUS
Status: DISCONTINUED | OUTPATIENT
Start: 2018-04-03 | End: 2018-04-03 | Stop reason: HOSPADM

## 2018-04-03 RX ORDER — NALBUPHINE HYDROCHLORIDE 10 MG/ML
10 INJECTION, SOLUTION INTRAMUSCULAR; INTRAVENOUS; SUBCUTANEOUS
Status: DISCONTINUED | OUTPATIENT
Start: 2018-04-03 | End: 2018-04-03 | Stop reason: HOSPADM

## 2018-04-03 RX ORDER — VECURONIUM BROMIDE FOR INJECTION 1 MG/ML
INJECTION, POWDER, LYOPHILIZED, FOR SOLUTION INTRAVENOUS AS NEEDED
Status: DISCONTINUED | OUTPATIENT
Start: 2018-04-03 | End: 2018-04-03 | Stop reason: HOSPADM

## 2018-04-03 RX ORDER — ONDANSETRON 2 MG/ML
INJECTION INTRAMUSCULAR; INTRAVENOUS AS NEEDED
Status: DISCONTINUED | OUTPATIENT
Start: 2018-04-03 | End: 2018-04-03 | Stop reason: HOSPADM

## 2018-04-03 RX ORDER — OXYCODONE HYDROCHLORIDE 5 MG/1
5-10 TABLET ORAL
Status: DISCONTINUED | OUTPATIENT
Start: 2018-04-03 | End: 2018-04-04 | Stop reason: HOSPADM

## 2018-04-03 RX ORDER — GLYCOPYRROLATE 0.2 MG/ML
INJECTION INTRAMUSCULAR; INTRAVENOUS AS NEEDED
Status: DISCONTINUED | OUTPATIENT
Start: 2018-04-03 | End: 2018-04-03 | Stop reason: HOSPADM

## 2018-04-03 RX ORDER — CLONAZEPAM 0.5 MG/1
0.5 TABLET ORAL
Status: DISCONTINUED | OUTPATIENT
Start: 2018-04-03 | End: 2018-04-04 | Stop reason: HOSPADM

## 2018-04-03 RX ORDER — DULOXETIN HYDROCHLORIDE 60 MG/1
120 CAPSULE, DELAYED RELEASE ORAL DAILY
Status: DISCONTINUED | OUTPATIENT
Start: 2018-04-04 | End: 2018-04-04 | Stop reason: HOSPADM

## 2018-04-03 RX ORDER — LAMOTRIGINE 100 MG/1
400 TABLET ORAL DAILY
Status: DISCONTINUED | OUTPATIENT
Start: 2018-04-04 | End: 2018-04-04 | Stop reason: HOSPADM

## 2018-04-03 RX ORDER — FENTANYL CITRATE 50 UG/ML
25 INJECTION, SOLUTION INTRAMUSCULAR; INTRAVENOUS AS NEEDED
Status: DISCONTINUED | OUTPATIENT
Start: 2018-04-03 | End: 2018-04-03 | Stop reason: HOSPADM

## 2018-04-03 RX ORDER — SUCCINYLCHOLINE CHLORIDE 20 MG/ML
INJECTION INTRAMUSCULAR; INTRAVENOUS AS NEEDED
Status: DISCONTINUED | OUTPATIENT
Start: 2018-04-03 | End: 2018-04-03 | Stop reason: HOSPADM

## 2018-04-03 RX ORDER — LITHIUM CARBONATE 300 MG
900 TABLET ORAL
Status: DISCONTINUED | OUTPATIENT
Start: 2018-04-03 | End: 2018-04-04 | Stop reason: HOSPADM

## 2018-04-03 RX ORDER — ENOXAPARIN SODIUM 100 MG/ML
40 INJECTION SUBCUTANEOUS
Status: COMPLETED | OUTPATIENT
Start: 2018-04-03 | End: 2018-04-03

## 2018-04-03 RX ORDER — DIPHENHYDRAMINE HYDROCHLORIDE 50 MG/ML
25 INJECTION, SOLUTION INTRAMUSCULAR; INTRAVENOUS
Status: DISCONTINUED | OUTPATIENT
Start: 2018-04-03 | End: 2018-04-04 | Stop reason: HOSPADM

## 2018-04-03 RX ORDER — ACETAMINOPHEN 325 MG/1
650 TABLET ORAL
Status: DISCONTINUED | OUTPATIENT
Start: 2018-04-03 | End: 2018-04-04 | Stop reason: HOSPADM

## 2018-04-03 RX ORDER — PROPOFOL 10 MG/ML
INJECTION, EMULSION INTRAVENOUS AS NEEDED
Status: DISCONTINUED | OUTPATIENT
Start: 2018-04-03 | End: 2018-04-03 | Stop reason: HOSPADM

## 2018-04-03 RX ORDER — HYOSCYAMINE SULFATE 0.12 MG/1
0.12 TABLET, ORALLY DISINTEGRATING ORAL
Status: DISCONTINUED | OUTPATIENT
Start: 2018-04-03 | End: 2018-04-04 | Stop reason: HOSPADM

## 2018-04-03 RX ORDER — SCOLOPAMINE TRANSDERMAL SYSTEM 1 MG/1
1 PATCH, EXTENDED RELEASE TRANSDERMAL
Status: DISCONTINUED | OUTPATIENT
Start: 2018-04-03 | End: 2018-04-04 | Stop reason: HOSPADM

## 2018-04-03 RX ORDER — BUPIVACAINE HYDROCHLORIDE AND EPINEPHRINE 5; 5 MG/ML; UG/ML
50 INJECTION, SOLUTION EPIDURAL; INTRACAUDAL; PERINEURAL ONCE
Status: DISPENSED | OUTPATIENT
Start: 2018-04-03 | End: 2018-04-03

## 2018-04-03 RX ADMIN — FENTANYL CITRATE 100 MCG: 50 INJECTION, SOLUTION INTRAMUSCULAR; INTRAVENOUS at 09:12

## 2018-04-03 RX ADMIN — LITHIUM CARBONATE 900 MG: 300 TABLET ORAL at 22:54

## 2018-04-03 RX ADMIN — SUCCINYLCHOLINE CHLORIDE 100 MG: 20 INJECTION INTRAMUSCULAR; INTRAVENOUS at 09:12

## 2018-04-03 RX ADMIN — GLYCOPYRROLATE 0.4 MG: 0.2 INJECTION INTRAMUSCULAR; INTRAVENOUS at 11:34

## 2018-04-03 RX ADMIN — VECURONIUM BROMIDE FOR INJECTION 1 MG: 1 INJECTION, POWDER, LYOPHILIZED, FOR SOLUTION INTRAVENOUS at 10:55

## 2018-04-03 RX ADMIN — PROPOFOL 200 MG: 10 INJECTION, EMULSION INTRAVENOUS at 09:12

## 2018-04-03 RX ADMIN — VECURONIUM BROMIDE FOR INJECTION 1 MG: 1 INJECTION, POWDER, LYOPHILIZED, FOR SOLUTION INTRAVENOUS at 11:17

## 2018-04-03 RX ADMIN — ONDANSETRON 4 MG: 2 INJECTION INTRAMUSCULAR; INTRAVENOUS at 11:08

## 2018-04-03 RX ADMIN — FENTANYL CITRATE 50 MCG: 50 INJECTION, SOLUTION INTRAMUSCULAR; INTRAVENOUS at 11:37

## 2018-04-03 RX ADMIN — MIDAZOLAM HYDROCHLORIDE 2 MG: 1 INJECTION, SOLUTION INTRAMUSCULAR; INTRAVENOUS at 09:08

## 2018-04-03 RX ADMIN — FENTANYL CITRATE 50 MCG: 50 INJECTION, SOLUTION INTRAMUSCULAR; INTRAVENOUS at 11:08

## 2018-04-03 RX ADMIN — ACETAMINOPHEN 650 MG: 325 TABLET, FILM COATED ORAL at 20:47

## 2018-04-03 RX ADMIN — SODIUM CHLORIDE, SODIUM LACTATE, POTASSIUM CHLORIDE, CALCIUM CHLORIDE, AND DEXTROSE MONOHYDRATE 150 ML/HR: 600; 310; 30; 20; 5 INJECTION, SOLUTION INTRAVENOUS at 20:47

## 2018-04-03 RX ADMIN — VECURONIUM BROMIDE FOR INJECTION 5 MG: 1 INJECTION, POWDER, LYOPHILIZED, FOR SOLUTION INTRAVENOUS at 09:31

## 2018-04-03 RX ADMIN — VECURONIUM BROMIDE FOR INJECTION 2 MG: 1 INJECTION, POWDER, LYOPHILIZED, FOR SOLUTION INTRAVENOUS at 10:00

## 2018-04-03 RX ADMIN — LIDOCAINE HYDROCHLORIDE 100 MG: 20 INJECTION, SOLUTION EPIDURAL; INFILTRATION; INTRACAUDAL; PERINEURAL at 09:12

## 2018-04-03 RX ADMIN — DEXAMETHASONE SODIUM PHOSPHATE 4 MG: 4 INJECTION, SOLUTION INTRA-ARTICULAR; INTRALESIONAL; INTRAMUSCULAR; INTRAVENOUS; SOFT TISSUE at 09:34

## 2018-04-03 RX ADMIN — VECURONIUM BROMIDE FOR INJECTION 2 MG: 1 INJECTION, POWDER, LYOPHILIZED, FOR SOLUTION INTRAVENOUS at 10:11

## 2018-04-03 RX ADMIN — SODIUM CHLORIDE, SODIUM LACTATE, POTASSIUM CHLORIDE, AND CALCIUM CHLORIDE 75 ML/HR: 600; 310; 30; 20 INJECTION, SOLUTION INTRAVENOUS at 07:20

## 2018-04-03 RX ADMIN — SODIUM CHLORIDE, SODIUM LACTATE, POTASSIUM CHLORIDE, CALCIUM CHLORIDE, AND DEXTROSE MONOHYDRATE 150 ML/HR: 600; 310; 30; 20; 5 INJECTION, SOLUTION INTRAVENOUS at 15:49

## 2018-04-03 RX ADMIN — ENOXAPARIN SODIUM 40 MG: 40 INJECTION SUBCUTANEOUS at 07:19

## 2018-04-03 RX ADMIN — ACETAMINOPHEN 650 MG: 325 TABLET, FILM COATED ORAL at 14:29

## 2018-04-03 RX ADMIN — OXYCODONE HYDROCHLORIDE 10 MG: 5 TABLET ORAL at 15:47

## 2018-04-03 RX ADMIN — KETOROLAC TROMETHAMINE 30 MG: 30 INJECTION, SOLUTION INTRAMUSCULAR at 18:25

## 2018-04-03 RX ADMIN — CEFAZOLIN 3 G: 1 INJECTION, POWDER, FOR SOLUTION INTRAMUSCULAR; INTRAVENOUS; PARENTERAL at 09:14

## 2018-04-03 RX ADMIN — KETOROLAC TROMETHAMINE 30 MG: 30 INJECTION, SOLUTION INTRAMUSCULAR at 12:39

## 2018-04-03 RX ADMIN — Medication 4 MG: at 11:34

## 2018-04-03 NOTE — PERIOP NOTES
1152 Pt received to PACU and connected to monitor. Vital signs stable. Nurse at bedside. Will continue to monitor. [address] [facility] Dr. Isaac Hairston at bedside to asses pt status and verify Allergies/MAR.

## 2018-04-03 NOTE — PERIOP NOTES
TRANSFER - OUT REPORT:    Verbal report given to ANAYA Dowling on Sean Marley  being transferred to 2200 (unit) for routine post - op       Report consisted of patients Situation, Background, Assessment and   Recommendations(SBAR). Information from the following report(s) SBAR, Kardex and MAR was reviewed with the receiving nurse. Lines:   Peripheral IV 04/03/18 Left Arm (Active)   Site Assessment Clean, dry, & intact 4/3/2018 11:52 AM   Phlebitis Assessment 0 4/3/2018 11:52 AM   Infiltration Assessment 0 4/3/2018 11:52 AM   Dressing Status Clean, dry, & intact 4/3/2018 11:52 AM   Dressing Type Transparent;Tape 4/3/2018 11:52 AM   Hub Color/Line Status Pink; Infusing 4/3/2018 11:52 AM        Opportunity for questions and clarification was provided.       Patient transported with:   [employer]

## 2018-04-03 NOTE — PROGRESS NOTES
conducted a pre-surgery visit with Joshua Lee, who is a 22 y.o.,female. The  provided the following Interventions:  Initiated a relationship of care and support. Offered prayer and assurance of continued prayers on patient's behalf. Plan:  Chaplains will continue to follow and will provide pastoral care on an as needed/requested basis.  recommends bedside caregivers page  on duty if patient shows signs of acute spiritual or emotional distress.     [facility]   Spiritual Care   [phone]

## 2018-04-03 NOTE — ANESTHESIA PREPROCEDURE EVALUATION
Anesthetic History   No history of anesthetic complications            Review of Systems / Medical History  Patient summary reviewed and pertinent labs reviewed    Pulmonary        Sleep apnea: No treatment           Neuro/Psych         Psychiatric history     Cardiovascular            Dysrhythmias       Exercise tolerance: >4 METS     GI/Hepatic/Renal     GERD: well controlled           Endo/Other        Morbid obesity and arthritis     Other Findings   Comments: Documentation of current medication  Current medications obtained, documented and obtained? YES      Risk Factors for Postoperative nausea/vomiting:       History of postoperative nausea/vomiting? NO       Female? YES       Motion sickness? NO       Intended opioid administration for postoperative analgesia? YES      Smoking Abstinence:  Current Smoker? NO  Elective Surgery? YES  Seen preoperatively by anesthesiologist or proxy prior to day of surgery? YES  Pt abstained from smoking 24 hours prior to anesthesia?  N/A    Preventive care/screening for High Blood Pressure:  Aged 18 years and older: YES  Screened for high blood pressure: YES  Patients with high blood pressure referred to primary care provider   for BP management: YES                 Physical Exam    Airway  Mallampati: II  TM Distance: 4 - 6 cm  Neck ROM: normal range of motion   Mouth opening: Normal     Cardiovascular    Rhythm: regular  Rate: normal         Dental  No notable dental hx       Pulmonary  Breath sounds clear to auscultation               Abdominal  GI exam deferred       Other Findings            Anesthetic Plan    ASA: 3  Anesthesia type: general          Induction: Intravenous  Anesthetic plan and risks discussed with: Patient

## 2018-04-03 NOTE — OP NOTES
Operative Note      PREOPERATIVE DIAGNOSIS: Clinically severe obesity, body mass index of Body mass index is 46.83 kg/(m^2). ,   comorbidities of obstructive sleep apnea - clinical .     POSTOPERATIVE DIAGNOSIS:  Same    PROCEDURES: Laparoscopic Mihai-en-Y gastric bypass with 956 cm retrocolic   retrogastric Mihai limb, 40 cm biliopancreatic limb, 15 ml    tubularized gastric pouch applied to the lesser curvature of stomach     SURGEON: Dr. Johnny Bravo MD FACS     ASSISTANT: SA Bon Anderson    ANESTHESIA: General endotracheal anesthesia. Local anesthetic mixture 1%   lidocaine, 0.5% Marcaine, with epinephrine injected locally utilizing 60mL. FINDINGS: as above    SPECIMEN: none    ESTIMATED BLOOD LOSS: 10mL    FLUIDS: 2700mL    URINE OUTPUT: 100mL. DRAIN: pimentel, removed at conclusion of operation    COMPLICATIONS: none    OPERATIVE START TIME: 0933    OPERATIVE COMPLETION TIME: 1138    DESCRIPTION OF PROCEDURE: The patient was prepped and draped in standard   sterile fashion after being placed under general anesthetic, pimentel catheter placement and intragastric placement of a 34 Fr Stepehn tube. LUQ pneumoperitoneum was established via Veress needle and the abdomen was insufflated to 15mmHg. A site was selected superior   to the umbilicus in the midline. This area was incised. An Ethicon 5   mm Optical trocar was placed over the laparoscope and directed into the abdominal   Cavity using Optiview technique. Abdomen was surveyed. There was no evidence of injury from the entry. Additional trocars were then placed. All were 12 mm trocars, except for a 5mm trocar placed in the anterior axillary line left upper quadrant approximately 3 fingerbreadths below the costal margin. Another was placed in the lateral portion of the left rectus sheath approximately 3 superior to the umbilical trocar.   Another was placed approximately 4 fingerbreadths superior to the umbilical trocar in the  lateral portion of right rectus sheath. All were placed after incising with scalpel, all were placed using blunt dissecting technique. There was no evidence of injury from the entries. Instrument were placed in the abdominal cavity. The omentum and transverse colon   were reflected superiorly. The ligament of Treitz was identified. We then marched out 40 cm distal to the ligament of Treitz, divided at this level with A 60 mm [place] stapler. One additional load was used to divide down to the base of the mesentery. Both limbs were noted to be well perfused with visible pulses. Then, from the distal staple line, the Mihai limb was measured 100 cm distally and an antimesenteric enterotomy was made using the Harmonic Scalpel. A similar enterotomy was made on the antimesenteric surface of the  biliopancreatic limb. Through these enterotomies, a 60 mm [place]   stapler was placed into the bowel, clamped on the anti-mesenteric borders and   fired to form a stapled side-to-side anastomosis. Remaining enterotomy was   closed in a running inverting fashion with 3-0 Vicryl suture. The mesenteric   defect was then closed with running 2-0 silk suture, extending on the bowel   wall in a running Lembert fashion for second layer closure of the   enterotomy. At this point, attention was directed to the transverse mesocolon. A site was selected just anterior to the ligament of Treitz. This area was incised using the Harmonic scalpel, entering the lesser sac. The Mihai limb was then passed through the fenestration of the transverse mesocolon and into the lesser sac taking care not to twist it on its mesentery. At this point, the omentum and transverse colon were retracted inferiorly. The greater curvature of the stomach was then grasped and the gastrocolic   ligament was dissected off of it directly on the wall of the stomach using   the Harmonic scalpel to widely open the lesser sac.  Adhesions between the   posterior wall of the stomach and the retroperitoneum were taken down under   direct vision to fully free the lesser sac. At this point, an incision was made near the xiphoid. Through this   incision, a Marvin retractor was placed through the abdominal   wall beneath the left lateral segment of the liver and connected to a bedside   retraction system allowing exposure of the esophageal hiatus. The angle of   His was then dissected anterior to posterior down the left dale of the   diaphragm using Harmonic scalpel. The lesser curvature of the stomach was then examined. A site was selected just   proximal to the crow's foot of the vagus nerve in this area. The gastrohepatic ligament was dissected away from the lesser curvature of the stomach directly on the wall of the stomach to enter the lesser sac. This fenestration was then widened using Bovie cautery over a 10 mm articulating esophageal retractor which had been placed through the lesser sac and brought through the fenestration. Then, a 60 mm Blue load of the [place] stapler was placed transversely across the stomach through this   fenestration, clamped. The Stephen tube was advanced into the pouch. It was withdrawn and advanced to ensure the stapler did not grab it. The stapler was then fired to begin the formation of the gastric pouch. Another stapler was placed near the crotch of the previous staple line and directed superiorly toward the angle of His, clamped and then fired. The Stephen tube was then advanced along the lesser curve to facilitate sizing of the pouch. Then, a 60 mm [place] stapler with Seam Guard reinforcement was placed in the crotch of the previous staple line and clamped. . The stapler was snugged against the Stephen tube and then fired. Then, 1 additional staple loads were placed, each from the crotch of previous   staple line directed superiorly toward the angle of His until the gastric   pouch was fully divided from the distal stomach remnant.      Once fully divided, the staple lines were examined, noted to be hemostatic and viable, intraabdominal fat was placed between the staple lines to prevent gastro-gastric fistulization. The tip of the gastric pouch placed in the lesser sac and the distal stomach remnant was   retracted anteriorly to allow exposure of the lesser sac. The Mihai limb was examined. The proximal 6 cm were noted to be tethered by its mesentery. This proximal segment was then excised from the mesentery using a 60 mm [place] staple load and then one additional load was used to divide the   devascularized segment from the remainder of the Mihai limb. This segment   was brought out of the abdominal cavity via one of the trocar sites, passed off the field and discarded. At this point, the gastrojejunostomy was begun by sewing the right   antimesenteric border of the Mihai limb to the distal portion of the gastric   pouch using running suture of 3-0 Vicryl. An anterior gastrotomy and   antimesenteric enterotomy were made. From the left apex of these 2   enterotomies, a 3-0 Vicryl suture was placed and run on the posterior surface   in a running inverting fashion to the right apex, transitioned on the anterior surface and sewn approximately half way across the aperture in an inverting fashion. Then, a second 3-0 Vicryl suture was placed at the left apex and sewn to the previous suture in a running inverting fashion on the anterior surface. Prior to tying these sutures, the Stephen tube was brought across the anastomosis. Then the sutures were tied, completing the inner layer. Then, from the left apex another 3-0 Vicryl suture was placed and run to the right apex in a running Lembert fashion completing the anterior outer layer of the anastomosis, thereby completing the anastomosis. Once this was complete, the Stephen tube was removed from the patient.  Then, working from within the lesser sac, the Mihai limb was sewn to the anterior surface of the transverse mesocolic defect with 3 interrupted sutures of 2-0 silk. Then, the left side of the mihai limb mesentery was closed to the left side of the tranverse mesocolic defect with a running suture of 2-0 silk. The omentum and transverse colon were reflected superiorly. The base of   the left side of the transverse mesocolic defect was exposed and this was   sewn to the base of the left side of the Mihai limb mesentery with a   pursestring suture of 2-0 silk. The Mihai limb was then retracted to the   left. The right side of the transverse mesocolic defect was then closed to the right side of the Mihai limb mesentery with another pursestring suture of 2-0 silk, then one additional interrupted suture was placed between the right lateral surface of the Mihai limb and the right lateral surface of the transverse mesocolic defect, completing the closure of the   defect around the Mihai limb. Once this was complete, both mesenteric defects were examined and noted to   be well closed. Both anastomoses were examined and noted to be hemostatic and viable without evidence of leak. The omentum and transverse colon were retracted   inferiorly back to normal position. The gastric remnant was placed over the   anastomosis, returning the anastomosis into the lesser sac. The Marvin   retractor was removed. The abdomen was desufflated via the remaining trocars. All trocars were then removed. The trocar sites were rendered hemostatic with Bovie cautery, anesthetized with the local anesthetic mixture and then closed with interrupted 4-0 Monocryl. Dermabond was applied. The patient was extubated and  transferred to the perioperative care area in stable condition. The patient tolerated the procedure well, there were no complications. Sponge instrument, and needle counts were reported as correct x2.

## 2018-04-03 NOTE — IP AVS SNAPSHOT
[address] Patient: Emile Marin MRN: [id number] :1995 About your hospitalization You were admitted on:  April 3, 2018 You last received care in the:  [address] [place] [address],[address] You were discharged on:  2018 Why you were hospitalized Your primary diagnosis was:  Not on File Your diagnoses also included: Morbid Obesity Due To Excess Calories (Hcc) Follow-up Information Follow up With Details Comments Contact Info Phuong Deng MD On 2018 8:30am [id number] [place] [address] [id number] 
[phone] Your Scheduled Appointments 2018  8:30 AM EDT TRANSITIONAL CARE MANAGEMENT with Phuong Deng MD  
[address]) [address] [place] [address] [address] [phone]  
[phone] 2018  3:15 PM EDT  
POST OP with Chucky Ribera MD  
[address] [place] [address]) [address] [place] [address] [address] [phone]  
[phone] Tuesday May 08, 2018  2:30 PM EDT NUTRITION COUNSELING with [facility] DIETICIAN [address] [place] [address] ([address] [place] [address]) [address] [place] [address] [phone]  
[phone] Discharge Orders None A check milagros indicates which time of day the medication should be taken. My Medications START taking these medications Instructions Each Dose to Equal  
 Morning Noon Evening Bedtime  
 enoxaparin 40 mg/0.4 mL Commonly known as:  LOVENOX Your last dose was: Your next dose is: 0.4 mL by SubCUTAneous route daily. 40 mg  
    
   
   
   
  
 naloxone 2 mg/actuation Spry Your last dose was: Your next dose is: Use 1 spray intranasally into 1 nostril. Use a new Narcan nasal spray for subsequent doses and administer into alternating nostrils. May repeat every 2 to 3 minutes as needed. oxyCODONE IR 5 mg immediate release tablet Commonly known as:  Nick Frankie Your last dose was: Your next dose is: Take 1-2 Tabs by mouth every six (6) hours as needed. Max Daily Amount: 40 mg.  
 5-10 mg CONTINUE taking these medications Instructions Each Dose to Equal  
 Morning Noon Evening Bedtime  
 calcium citrate Tab tablet Your last dose was: Your next dose is: Take  by mouth daily. cholecalciferol 1,000 unit tablet Commonly known as:  VITAMIN D3 Your last dose was: Your next dose is: Take 1,000 Units by mouth daily. 1000 Units CYMBALTA 60 mg capsule Generic drug:  DULoxetine Your last dose was: Your next dose is: Take 120 mg by mouth daily. 120 mg  
    
   
   
   
  
 FLINTSTONES GUMMIES PO Your last dose was: Your next dose is: Take  by mouth. KlonoPIN 0.5 mg tablet Generic drug:  clonazePAM  
   
Your last dose was: Your next dose is: Take 0.5 mg by mouth as needed. 0.5 mg LaMICtal 200 mg tablet Generic drug:  lamoTRIgine Your last dose was: Your next dose is: Take 400 mg by mouth daily. 400 mg  
    
   
   
   
  
 lithium carbonate 300 mg tablet Your last dose was: Your next dose is: Take 900 mg by mouth nightly. 900 mg  
    
   
   
   
  
 loratadine 10 mg tablet Commonly known as:  Fabby Nice Your last dose was: Your next dose is: Take 1 Tab by mouth daily. 10 mg  
    
   
   
   
  
 spironolactone 50 mg tablet Commonly known as:  ALDACTONE Your last dose was: Your next dose is: Take 50 mg by mouth daily. 50 mg  
    
   
   
   
  
  
STOP taking these medications ROBAXIN 500 mg tablet Generic drug:  methocarbamol Where to Get Your Medications Information on where to get these meds will be given to you by the nurse or doctor. ! Ask your nurse or doctor about these medications  
  enoxaparin 40 mg/0.4 mL  
 naloxone 2 mg/actuation Spry  
 oxyCODONE IR 5 mg immediate release tablet Opioid Education Prescription Opioids: What You Need to Know: 
 
Prescription opioids can be used to help relieve moderate-to-severe pain and are often prescribed following a surgery or injury, or for certain health conditions. These medications can be an important part of treatment but also come with serious risks. Opioids are strong pain medicines. Examples include hydrocodone, oxycodone, fentanyl, and morphine. Heroin is an example of an illegal opioid. It is important to work with your health care provider to make sure you are getting the safest, most effective care. WHAT ARE THE RISKS AND SIDE EFFECTS OF OPIOID USE? Prescription opioids carry serious risks of addiction and overdose, especially with prolonged use. An opioid overdose, often marked by slow breathing, can cause sudden death. The use of prescription opioids can have a number of side effects as well, even when taken as directed. · Tolerance-meaning you might need to take more of a medication for the same pain relief · Physical dependence-meaning you have symptoms of withdrawal when the medication is stopped. Withdrawal symptoms can include nausea, sweating, chills, diarrhea, stomach cramps, and muscle aches. Withdrawal can last up to several weeks, depending on which drug you took and how long you took it. · Increased sensitivity to pain · Constipation · Nausea, vomiting, and dry mouth · Sleepiness and dizziness · Confusion · Depression · Low levels of testosterone that can result in lower sex drive, energy, and strength · Itching and sweating RISKS ARE GREATER WITH:      
· History of drug misuse, substance use disorder, or overdose · Mental health conditions (such as depression or anxiety) · Sleep apnea · Older age (72 years or older) · Pregnancy Avoid alcohol while taking prescription opioids. Also, unless specifically advised by your health care provider, medications to avoid include: · Benzodiazepines (such as Xanax or Valium) · Muscle relaxants (such as Soma or Flexeril) · Hypnotics (such as Ambien or Lunesta) · Other prescription opioids KNOW YOUR OPTIONS Talk to your health care provider about ways to manage your pain that don't involve prescription opioids. Some of these options may actually work better and have fewer risks and side effects. Options may include: 
· Pain relievers such as acetaminophen, ibuprofen, and naproxen · Some medications that are also used for depression or seizures · Physical therapy and exercise · Counseling to help patients learn how to cope better with triggers of pain and stress. · Application of heat or cold compress · Massage therapy · Relaxation techniques Be Informed Make sure you know the name of your medication, how much and how often to take it, and its potential risks & side effects. IF YOU ARE PRESCRIBED OPIOIDS FOR PAIN: 
· Never take opioids in greater amounts or more often than prescribed. Remember the goal is not to be pain-free but to manage your pain at a tolerable level. · Follow up with your primary care provider to: · Work together to create a plan on how to manage your pain. · Talk about ways to help manage your pain that don't involve prescription opioids. · Talk about any and all concerns and side effects. · Help prevent misuse and abuse. · Never sell or share prescription opioids · Help prevent misuse and abuse. · Store prescription opioids in a secure place and out of reach of others (this may include visitors, children, friends, and family). · Safely dispose of unused/unwanted prescription opioids: Find your community drug take-back program or your pharmacy mail-back program, or flush them down the toilet, following guidance from the Food and Drug Administration (www.fda.gov/Drugs/ResourcesForYou). · Visit www.cdc.gov/drugoverdose to learn about the risks of opioid abuse and overdose. · If you believe you may be struggling with addiction, tell your health care provider and ask for guidance or call [employer] at 3-775-802-BIYD. Discharge Instructions Discharge Diet: 
Clear Liquid Bariatric Diet for 7 days, then soft moist protein for 5 weeks Discharge Medications: 
 *All medications as per Medicatoin Reconciliation Form* Flintstones Complete Chewable vitamins, 2 orally daily for life Calcium Citrate 2000mg orally daily for life Vitamin B12 1000micrograms sublingual daily for life Oxycodone 5mg tab, 1-2 by mouth every 4-6 hours as needed for pain not controlled with Tylenol Enoxaparin (Lovenox) 40mg sub-Q daily for 7 days Local wound care with daily showers, keep wounds clean and dry Activity: as desired, no lifting greater than 15lbs or situps for 30 days Special Instructions: 
 No driving until activity is not influenced by incisional pain and off narcotics No bath or hot tub until wounds are healed Pulse and temperature twice daily for 10 days Notify [facility] for a Temp >100.5 or Pulse>115 Followup with surgeon in 2 weeks DISCHARGE SUMMARY from Nurse PATIENT INSTRUCTIONS: 
 
 
F-face looks uneven A-arms unable to move or move unevenly S-speech slurred or non-existent T-time-call 911 as soon as signs and symptoms begin-DO NOT go Back to bed or wait to see if you get better-TIME IS BRAIN. Warning Signs of HEART ATTACK Call 911 if you have these symptoms: 
? Chest discomfort. Most heart attacks involve discomfort in the center of the chest that lasts more than a few minutes, or that goes away and comes back. It can feel like uncomfortable pressure, squeezing, fullness, or pain. ? Discomfort in other areas of the upper body. Symptoms can include pain or discomfort in one or both arms, the back, neck, jaw, or stomach. ? Shortness of breath with or without chest discomfort. ? Other signs may include breaking out in a cold sweat, nausea, or lightheadedness. Don't wait more than five minutes to call [address]! Fast action can save your life. Calling 911 is almost always the fastest way to get lifesaving treatment. Emergency Medical Services staff can begin treatment when they arrive  up to an hour sooner than if someone gets to the hospital by car. The discharge information has been reviewed with the patient. The patient verbalized understanding. Discharge medications reviewed with the patient and appropriate educational materials and side effects teaching were provided. Patient armband removed and shredded. ___________________________________________________________________________________________________________________________________ Introducing [facility] HEALTH [facility]! Dear [employer]: 
Thank you for requesting a Altobeam account. Our records indicate that you already have an active Altobeam account.   You can access your account anytime at [email or web address] Did you know that you can access your hospital and ER discharge instructions at any time in [employer]? You can also review all of your test results from your hospital stay or ER visit. Additional Information If you have questions, please visit the Frequently Asked Questions section of the [employer] website at [email or web address] Remember, [employer] is NOT to be used for urgent needs. For medical emergencies, dial 911. Now available from your iPhone and Android! Introducing Milton Cleary As a [facility] patient, I wanted to make you aware of our electronic visit tool called Milton Cleary. [facility] [employer] 24/7 allows you to connect within minutes with a medical provider 24 hours a day, seven days a week via a mobile device or tablet or logging into a secure website from your computer. You can access Milton Cleary from anywhere in the [place]. A virtual visit might be right for you when you have a simple condition and feel like you just dont want to get out of bed, or cant get away from work for an appointment, when your regular [facility] provider is not available (evenings, weekends or holidays), or when youre out of town and need minor care. Electronic visits cost only $49 and if the [facility] 24/7 provider determines a prescription is needed to treat your condition, one can be electronically transmitted to a nearby pharmacy*. Please take a moment to enroll today if you have not already done so. The enrollment process is free and takes just a few minutes. To enroll, please download the [facility] 24/7 marlene to your tablet or phone, or visit [email or web address] to enroll on your computer.    
And, as an [address] [place] [address] patient with a [employer] account, the results of your visits will be scanned into your electronic medical record and your primary care provider will be able to view the scanned results. We urge you to continue to see your regular [facility] provider for your ongoing medical care. And while your primary care provider may not be the one available when you seek a Media[employer] virtual visit, the peace of mind you get from getting a real diagnosis real time can be priceless. For more information on Media[employer], view our Frequently Asked Questions (FAQs) at [email or web address]. Sincerely, 
 
Dianah Sicard, MD 
Chief Medical Officer [address] *:  certain medications cannot be prescribed via MediaSpike Providers Seen During Your Hospitalization Provider Specialty Primary office phone Airam Calhoun MD General Surgery [phone] Your Primary Care Physician (PCP) Primary Care Physician Office Phone Office Fax Carmelita Venegas [phone] You are allergic to the following Allergen Reactions Abilify (Aripiprazole) Anaphylaxis Dilaudid (Hydromorphone (Bulk)) Itching Pt reports she became delusion after getting this med. Hydrocodone Itching All the hydrocodones Morphine Itching Other Medication Hives Polyester Roxicet (Oxycodone-Acetaminophen) Itching Vicodin (Hydrocodone-Acetaminophen) Itching Itching was severe and she became confused. Recent Documentation Height Weight BMI OB Status Smoking Status 1.702 m 135.6 kg 46.83 kg/m2 Polycystic Ovarian Syndrome Never Smoker Emergency Contacts Name Discharge Info Relation Home Work Mobile Jose M Gardner DISCHARGE CAREGIVER [3] Boyfriend [17] [phone] Patient Belongings  The following personal items are in your possession at time of discharge: 
  Dental Appliances: None  Visual Aid: Glasses      Home Medications: None Jewelry: None  Clothing: Shirt, Socks, Sweater, Footwear, Undergarments, Pants    Other Valuables: None Discharge Instructions Attachments/References GASTRIC BYPASS: LAPAROSCOPIC MIHAI-EN-Y: POST-OP (ENGLISH) ENOXAPARIN (LOVENOX) (ENGLISH) OXYCODONE, RAPID RELEASE (BY MOUTH) (ENGLISH) Patient Handouts Laparoscopic Mihai-en-Y Gastric Bypass: What to Expect at Home Your Recovery A Mihai-en-Y (say \"marilynn-en-why\") gastric bypass is surgery to make the stomach smaller and change the connection between the stomach and the intestines. It is done to help people lose weight. The surgery limits the amount of food the stomach can hold. This helps you eat less and feel full sooner. The cuts (incisions) the doctor made in your belly will probably be sore for several days after the surgery. If you have stitches, the doctor will take these out at your follow-up visit. You probably will lose weight very quickly in the first few months after surgery. As time goes on, your weight loss will slow down. You can expect most of your weight loss to happen in the first 12 months after your surgery. You will have regular doctor's appointments during this time to check how you are doing. It is important to think of this surgery as a tool to help you lose weight. It is not an instant fix. You will still need to eat a healthy diet and get regular exercise. This will help you reach your weight goal and avoid regaining the weight you lose. It is common to have many different emotions after this surgery. You may feel happy or excited as you begin to lose weight. But you may also feel overwhelmed or frustrated by the changes that you have to make in your diet, activity, and lifestyle. Talk with your doctor if you have concerns or questions. This care sheet gives you a general idea about how long it will take for you to recover. But each person recovers at a different pace.  Follow the steps below to get better as quickly as possible. How can you care for yourself at home? Activity ? · Rest when you feel tired. Getting enough sleep will help you recover. ? · Try to walk each day. Start by walking a little more than you did the day before. Bit by bit, increase the amount you walk. Walking boosts blood flow and helps prevent pneumonia and constipation. ? · Avoid strenuous activities, such as bicycle riding, jogging, weight lifting, or aerobic exercise, until your doctor says it is okay. ? · Until your doctor says it is okay, avoid lifting anything that would make you strain. This may include a child, heavy grocery bags and milk containers, a heavy briefcase or backpack, cat litter or dog food bags, or a vacuum . ? · Hold a pillow over your incisions when you cough or take deep breaths. This will support your belly and decrease your pain. ? · Do breathing exercises at home as instructed by your doctor. This will help prevent pneumonia. ? · Ask your doctor when you can drive again. ? · You will probably need to take 2 to 4 weeks off from work. It depends on the type of work you do and how you feel. You will probably return to normal activities within 3 to 5 weeks. ? · You may shower, if your doctor okays it. Pat the incisions dry. Do not take a bath for the first 2 weeks, or until your doctor tells you it is okay. ? · Ask your doctor when it is okay for you to have sex. Diet ? · Your doctor will give you specific instructions about what to eat after the surgery. For the first 2 to 6 weeks, you will need to follow a liquid or soft diet. Bit by bit, you will be able to add solid foods back into your diet. ? · Your doctor may recommend that you work with a dietitian to plan healthy meals that give you enough protein, vitamins, and minerals while you are losing weight.  Even with a healthy diet, you probably will need to take vitamin and mineral supplements for the rest of your life. ? · At first you may feel full after just a few sips of water or other liquid. It is important to try to sip water throughout the day to avoid becoming dehydrated. ·  
? · You may notice that your bowel movements are not regular right after your surgery. This is common. Try to avoid constipation and straining with bowel movements. ? · Sometimes the stomach empties food into the small intestine too quickly. This is called dumping syndrome. It can cause diarrhea and make you feel faint, shaky, and nauseated. It also can make it hard for your body to get enough nutrition. ¨ High-sugar foods-such as desserts, soda pop, and fruit juices-are most likely to cause dumping syndrome. Avoid high-sugar foods, or use products that have artificial sweeteners if sugar gives you a problem. ¨ Do not drink liquids within a half hour before eating and up to an hour after eating. Liquids move food even more quickly into the small intestine. Quick emptying of the stomach increases the chance of diarrhea. ¨ Eat slowly. Try to chew each bite about 20 times. Allow 20 to 30 minutes for each meal. 
¨ Eat 5 or 6 small meals or snacks a day. This may keep you from feeling too full after eating and may reduce problems with diarrhea and dumping syndrome. Medicines ? · Your doctor will tell you if and when you can restart your medicines. He or she will also give you instructions about taking any new medicines. ? · If you take blood thinners, such as warfarin (Coumadin), clopidogrel (Plavix), or aspirin, be sure to talk to your doctor. He or she will tell you if and when to start taking those medicines again. Make sure that you understand exactly what your doctor wants you to do. ? · Be safe with medicines. Take pain medicines exactly as directed. ¨ If the doctor gave you a prescription medicine for pain, take it as prescribed. ¨ If you are not taking a prescription pain medicine, ask your doctor if you can take an over-the-counter medicine. ? · If you think your pain medicine is making you sick to your stomach: 
¨ Take your medicine after meals (unless your doctor has told you not to). ¨ Ask your doctor for a different pain medicine. ? · If your doctor prescribed antibiotics, take them as directed. Do not stop taking them just because you feel better. You need to take the full course of antibiotics. Incision care ? · If you have strips of tape on the incisions, leave the tape on for a week or until it falls off.  
? slow healing. You may cover the area with a gauze bandage if it weeps or rubs against clothing. Change the bandage every day. ? · Keep the area clean and dry. Follow-up care is a key part of your treatment and safety. Be sure to make and go to all appointments, and call your doctor if you are having problems. It's also a good idea to know your test results and keep a list of the medicines you take. When should you call for help? Call 911 anytime you think you may need emergency care. For example, call if: 
? · You passed out (lost consciousness). ? · You are short of breath. ?Call your doctor now or seek immediate medical care if: 
? · You have pain that does not get better after you take pain medicine. ? · You cannot pass stool or gas. ? · You are sick to your stomach and cannot drink fluids. ? · You have loose stitches, or your incision comes open. ? · You have signs of a blood clot, such as: 
¨ Pain in your calf, back of the knee, thigh, or groin. ¨ Redness and swelling in your leg or groin. ? · You have signs of infection, such as: 
¨ Increased pain, swelling, warmth, or redness. ¨ Red streaks leading from the incision. ¨ Pus draining from the incision. ¨ A fever. ? Watch closely for changes in your health, and be sure to contact your doctor if you have any problems. Where can you learn more? Go to http://umer-mallika.info/. Enter Y354 in the search box to learn more about \"Laparoscopic Mihai-en-Y Gastric Bypass: What to Expect at Home. \" Current as of: October 13, 2016 Content Version: 11.4 © 7418-0003 [employer]. Care instructions adapted under license by [employer] (which disclaims liability or warranty for this information). If you have questions about a medical condition or this instruction, always ask your healthcare professional. [address] any warranty or liability for your use of this information. Enoxaparin (Lovenox): Care Instructions Your Care Instructions Enoxaparin (Lovenox) is an anticoagulant medicine. It is one of a class of anticoagulants called low molecular weight heparin. Many people call these medicines blood thinners. They don't actually thin the blood, but they increase the time it takes a blood clot to form. This reduces the chance of a blood clot in the leg veins (deep vein thrombosis) or in the lungs (pulmonary embolism). Enoxaparin is a shot (injection). You or someone caring for you will inject it once or twice a day. Most people need shots for 5 to 10 days, but in some cases it can be longer. Your doctor will tell you how long you need to have the shots. Enoxaparin is used to: · Treat deep vein thrombosis (DVT), which is a blood clot in the legs, pelvis, or arms. · Reduce the chance of getting blood clots after certain surgeries. For example, you may take enoxaparin after knee or hip replacement surgery. · Reduce the chance of getting blood clots in people who are likely to get them and who are not active for a long period of time. For example, you may need enoxaparin if you need to stay in bed for a long time because of a health problem.  
· Reduce the chance of blood clots when another blood thinner is stopped for a short time. For example, if you take warfarin and need surgery, your doctor may ask you to stop taking warfarin for a short time before the surgery. You will take enoxaparin to help prevent blood clots before the surgery. After the surgery, your doctor will tell you when it is safe to start taking warfarin again. This is called bridge therapy. Follow-up care is a key part of your treatment and safety. Be sure to make and go to all appointments, and call your doctor if you are having problems. It's also a good idea to know your test results and keep a list of the medicines you take. How can you care for yourself at home? How to inject enoxaparin You will get a prescription for prefilled syringes. Inject the medicine at the same time every day unless your doctor gives you other instructions. 1. Wash and dry your hands. 2. Sit or lie in a position that lets you see your belly. 3. Clean the injection site with an alcohol pad or swab, and let it dry. Choose a site on the right or left side of your belly, at least 2 inches from your belly button. Change the site each time you inject the medicine. 4. Remove the needle cap by pulling it straight off. Don't twist it. 5. Hold the syringe like a pencil in one hand. With the other hand, pinch an area of the injection site skin. You should have a \"fold\" in the skin. 6. Insert the entire needle straight down into the fold of skin. Don't insert the needle at an angle. 7. Press the plunger with your thumb until the syringe is empty. 8. Pull the needle straight out and let go of the skin. 9. Point the needle away from you and press down on the plunger. The needle will be covered. Take precautions · Don't rub the injection site. This could cause bruising. · Don't push air bubbles out of the syringe unless your doctor tells you to. Each syringe comes with air bubbles. · Don't stop taking enoxaparin without talking to your doctor. · If you are taking a blood thinner, be sure you get instructions about how to take your medicine safely. Blood thinners can cause serious bleeding problems. · Talk to your doctor before you take any prescription medicines, over-the-counter medicines, antibiotics, vitamins, or herbal products. · Don't take the following medicines unless your doctor says it's okay: ¨ Aspirin, products like aspirin (salicylates), or products that contain aspirin ¨ Nonsteroidal anti-inflammatory drugs (NSAIDs), such as ibuprofen (Advil, Motrin) and naproxen (Aleve) · Store enoxaparin at room temperature. Don't put it in the refrigerator or freezer. When should you call for help? Call 911 anytime you think you may need emergency care. For example, call if: 
? · You passed out (lost consciousness). ? · You have signs of severe bleeding, such as: ¨ A severe headache that is different from past headaches. ¨ Vomiting blood or what looks like coffee grounds. ¨ Passing maroon or very bloody stools. ?Call your doctor now or seek immediate medical care if: 
? · You have unexpected bleeding, including: ¨ Blood in stools or black stools that look like tar. ¨ Blood in your urine. ¨ Bruises or blood spots under the skin. ? · You feel dizzy or lightheaded. ? Watch closely for changes in your health, and be sure to contact your doctor if: 
? · You do not get better as expected. Where can you learn more? Go to http://umer-mallika.info/. Enter P266 in the search box to learn more about \"Enoxaparin (Lovenox): Care Instructions. \" Current as of: September 21, 2016 Content Version: 11.4 © 8884-2995 [employer]. Care instructions adapted under license by [employer] (which disclaims liability or warranty for this information).  If you have questions about a medical condition or this instruction, always ask your healthcare professional. [employer] disclaims any warranty or liability for your use of this information. Oxycodone, Rapid Release (By mouth) Oxycodone Hydrochloride (ix-g-CAL-done shailesh-droe-KLOR-damaso) Treats moderate to severe pain. This medicine is a narcotic pain reliever. Brand Name(s): Oxaydo, Oxy IR, Roxicodone There may be other brand names for this medicine. When This Medicine Should Not Be Used: This medicine is not right for everyone. Do not use it if you had an allergic reaction to oxycodone, codeine, hydrocodone, dihydrocodeine, or morphine, or you have a stomach or bowel blockage. How to Use This Medicine:  
Capsule, Liquid, Tablet · Take your medicine as directed. Your dose may need to be changed several times to find what works best for you. · An overdose can be dangerous. Follow directions carefully so you do not get too much medicine at one time. · Oral liquid: Measure the oral liquid medicine with a marked measuring spoon, oral syringe, or medicine cup. · Oxaydo® tablet: Swallow it whole with enough water to swallow it completely. Do not break, crush, chew, or dissolve it. Do not wet the tablet before you put it in your mouth. · This medicine should come with a Medication Guide. Ask your pharmacist for a copy if you do not have one. · Missed dose: Take a dose as soon as you remember. If it is almost time for your next dose, wait until then and take a regular dose. Do not take extra medicine to make up for a missed dose. · Store the medicine in a closed container at room temperature, away from heat, moisture, and direct light. Store the medicine in a secure place to prevent others from getting it. Ask your pharmacist about the best way to dispose of medicine you do not use. Drugs and Foods to Avoid: Ask your doctor or pharmacist before using any other medicine, including over-the-counter medicines, vitamins, and herbal products. · Do not use this medicine if you are using or have used an MAO inhibitor within the past 14 days. · Some medicines can affect how oxycodone works. Tell your doctor if you are using any of the following: ¨ Amiodarone, carbamazepine, erythromycin, ketoconazole, phenytoin, quinidine, rifampin, ritonavir ¨ Diuretic (water pill) ¨ Medicine to treat depression or anxiety ¨ Medicine to treat migraine headaches ¨ Phenothiazine medicine · Tell your doctor if you use anything else that makes you sleepy. Some examples are allergy medicine, narcotic pain medicine, and alcohol. Tell your doctor if you are using buprenorphine, butorphanol, nalbuphine, pentazocine, or a muscle relaxer. · Do not drink alcohol while you are using this medicine. Warnings While Using This Medicine: · Tell your doctor if you are pregnant or breastfeeding, or if you have kidney disease, liver disease, heart disease, low blood pressure, lung disease or breathing problems (such as asthma, COPD), scoliosis, an enlarged prostate or trouble urinating, an underactive thyroid, Bonifacio disease, gallbladder or pancreas problems, or digestion problems. Tell your doctor if you have a history of head injury, brain tumor, mental health problems, seizures, or alcohol or drug addiction. · This medicine may cause the following problems: 
¨ High risk of overdose, which can lead to death ¨ Respiratory depression (serious breathing problem that can be life-threatening) ¨ Serotonin syndrome, when used with certain medicines · This medicine may make you dizzy, drowsy, or faint. Do not drive or do anything else that could be dangerous until you know how this medicine affects you. Sit or lie down if you feel dizzy. Stand up carefully. · This medicine can be habit-forming. Do not use more than your prescribed dose. Call your doctor if you think your medicine is not working. · Do not stop using this medicine suddenly.  Your doctor will need to slowly decrease your dose before you stop it completely. · This medicine may cause constipation, especially with long-term use. Ask your doctor if you should use a laxative to prevent and treat constipation. Drink plenty of liquids to help avoid constipation. · This medicine could cause infertility. Talk with your doctor before using this medicine if you plan to have children. · Keep all medicine out of the reach of children. Never share your medicine with anyone. Possible Side Effects While Using This Medicine:  
Call your doctor right away if you notice any of these side effects: · Allergic reaction: Itching or hives, swelling in your face or hands, swelling or tingling in your mouth or throat, chest tightness, trouble breathing · Anxiety, restlessness, fast heartbeat, fever, sweating, muscle spasms, twitching, nausea, vomiting, diarrhea, seeing or hearing things that are not there · Blue lips, fingernails, or skin, trouble breathing · Extreme dizziness or weakness, shallow breathing, slow heartbeat, sweating, cold or clammy skin, seizures · Lightheadedness, dizziness, fainting · Severe constipation, stomach pain If you notice these less serious side effects, talk with your doctor: · Mild constipation · Sleepiness, tiredness If you notice other side effects that you think are caused by this medicine, tell your doctor. Call your doctor for medical advice about side effects. You may report side effects to FDA at 4-244-FDA-6067 © 2017 [address] Information is for End User's use only and may not be sold, redistributed or otherwise used for commercial purposes. The above information is an  only. It is not intended as medical advice for individual conditions or treatments. Talk to your doctor, nurse or pharmacist before following any medical regimen to see if it is safe and effective for you. Please provide this summary of care documentation to your next provider. Signatures-by signing, you are acknowledging that this After Visit Summary has been reviewed with you and you have received a copy. Patient Signature:  ____________________________________________________________ Date:  ____________________________________________________________  
  
Angelina [facility] Provider Signature:  ____________________________________________________________ Date:  ____________________________________________________________

## 2018-04-03 NOTE — H&P (VIEW-ONLY)
Alma Gonzalez is a 20 y.o. female who comes into the office today after completing the entirety of the bariatric preoperative protocol satisfactorily. 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 She has tried a variety of unsupervised weight-loss attempts, but has yet to meet with lasting success. Today, the patient is Height: 5' 7\" (170.2 cm) tall, Weight: 140.2 kg (309 lb) lbs for a Body mass index is 48.4 kg/(m^2). It is due to their severe obesity, which is further complicated by obstructive sleep apnea - clinical  that the patient is now seeking out bariatric surgery, specifically, the gastric bypass. Past Medical History:   Diagnosis Date    Chronic pain     Depression     GERD (gastroesophageal reflux disease)     Headache(784.0)     Hypercholesterolemia     IBS (irritable bowel syndrome)     Keratosis pilaris     Osteoarthrosis 4/25/2014    PCOS (polycystic ovarian syndrome)     Right bundle branch block     Sciatic nerve injury     Scoliosis     Sleep apnea     [facility]     Tailbone injury     Tricuspid valve disorder     Ruiz-Parkinson-White syndrome        Past Surgical History:   Procedure Laterality Date    HX ANGIOPLASTY  2002    HX HEART VALVE SURGERY  2000    oblation       Current Outpatient Prescriptions   Medication Sig Dispense Refill    PEDIATRIC MULTIVITAMIN NO.49 (FLINTSTONES GUMMIES PO) Take  by mouth.  SPIRONOLACTONE PO Take  by mouth.  metFORMIN (GLUMETZA) 1,000 mg TG24 24 hour tablet Take  by mouth.  methocarbamol (ROBAXIN) 500 mg tablet Take  by mouth four (4) times daily.  lithium carbonate 300 mg tablet Take 300 mg by mouth three (3) times daily.  calcium citrate tab tablet Take  by mouth daily.  LEVONORGESTREL-ETHIN ESTRADIOL (AVIANE PO) Take  by mouth.       ergocalciferol (ERGOCALCIFEROL) 50,000 unit capsule Take 1 Cap by mouth every seven (7) days. 4 Cap 3    lamoTRIgine (LAMICTAL) 200 mg tablet Take 200 mg by mouth daily.  clonazePAM (KLONOPIN) 0.5 mg tablet Take 0.5 mg by mouth once.  DULoxetine (CYMBALTA) 20 mg capsule Take 120 mg by mouth daily.  loratadine (CLARITIN) 10 mg tablet Take 1 Tab by mouth daily. 30 Tab 3       Allergies   Allergen Reactions    Abilify [Aripiprazole] Anaphylaxis    Dilaudid [Hydromorphone (Bulk)] Itching     Pt reports she became delusion after getting this med.     Hydrocodone Itching     All the hydrocodones      Other Medication Hives     Polyester     Roxicet [Oxycodone-Acetaminophen] Itching    Vicodin [Hydrocodone-Acetaminophen] Itching       Social History   Substance Use Topics    Smoking status: Never Smoker    Smokeless tobacco: Never Used    Alcohol use No       Family History   Problem Relation Age of Onset    Cancer Maternal Grandmother     Seizures Maternal Grandmother     Migraines Maternal Grandmother     Arthritis-osteo Maternal Grandmother     Arthritis-rheumatoid Maternal Grandmother     Thyroid Disease Maternal Grandmother     Lung Disease Maternal Grandmother     High Cholesterol Mother     Hypertension Mother     Elevated Lipids Mother     Heart Disease Father     Psychiatric Disorder Father     Hypertension Father     Diabetes Father     Cancer Paternal Grandmother          ROS, positive where in bold:    General: fevers, chills, night sweats, fatigue, weight loss, weight gain    GI: abdominal pain, nausea, vomiting, change in bowel habits, hematochezia, melena, GERD    Integumentary: dermatitis or abnormal moles    HEENT:  visual changes, vertigo, epistaxis, dysphagia, hoarseness    Cardiac: chest pain, palpitations, hypertension, edema,  varicosities    Resp:  cough, shortness of breath, wheezing, hemoptysis, snoring, reactive airway disease    : hematuria, dysuria, frequency, urgency, nocturia, stress urinary incontinence    MSK: weakness, joint pain, arthritis, sciatica    Endocrine: diabetes, thyroid disease, polyuria, polydipsia, polyphagia, poor wound healing, heat intolerance, cold intolerance    Lymph/Heme: anemia, bruising, history of blood transfusions    Neuro: dizziness, headache, fainting, seizures, stroke    Psychiatry:  Anxiety, depression, bipolar d/o, borderline personality disorder psychosis      Physical Exam:  Visit Vitals    BP (!) 145/100 (BP 1 Location: Right arm, BP Patient Position: Sitting)    Pulse (!) 105    Temp 96.1 °F (35.6 °C) (Oral)    Resp 16    Ht 5' 7\" (1.702 m)    Wt 140.2 kg (309 lb)    SpO2 98%    BMI 48.4 kg/m2       General: Well developed, well nourished 20 y.o. female in no acute distress  ENMT: normocephalic, atraumatic mouth:clear, no overt lesions, oral mucosa pink and moist  Neck: supple, no masses, no adenopathy or carotid bruits, trachea midline  Skin: warm, smooth, dry and well perfused  Respiratory: clear to auscultation bilaterally, no wheeze, rhonchi or rales, excursions normal and symmetrical  Cardiovascular: Regular rate and rhythm, no murmurs, clicks, gallops or rubs, no edema or varicosities  Gastrointestinal: soft, nontender, nondistended, normoactive bowel sounds, no hernias, no hepatosplenomegaly, easily palpable costal margins,  mixed distribution  Musculoskeletal: warm, well-perfused, no tenderness or swelling, normal gait/station  Neuro: sensation and strength grossly intact and symmetrical  Psych: alert and oriented to person, place and time      Studies:    Labs: within normal limits except for elevated cholesterol, low Vit D (supplemented)    EKG: without significant abnormalities    Nutritional evaluation has deemed a good candidate for weight loss surgery    Psychiatric evaluation has deemed a good candidate for weight loss surgery    Impression:    Liliana Muñoz is a 20 y.o. female who is suffering from morbid obesity and its attendant comorbidities who would benefit from bariatric surgery. We've discussed the restrictive and malabsorptive nature of the gastric bypass. The patient understands the likelihood of losing approximately 80% of their excess weight in 12 to 18 months. The patient also understands the risks including but not limited to bleeding, infection, need for reoperation, anastomotic ulcers, leaks and strictures, bowel obstruction secondary to adhesions and internal hernias, DVT, PE, heart attack, stroke, and death. Patient also understands risks of inadequate weight loss, excess weight loss, vitamin insufficiency, protein malnutrition, excess skin, and loss of hair. We have reviewed the components of a successful postoperative course including requirement for a high protein, low carbohydrate diet, 60 oz a day of zero calorie liquids, daily vitamin supplementation, daily exercise, regular follow-up, and participation in support groups. We have reviewed the preoperative clear liquid diet, as well as reviewed any medication changes required while on the clear liquid diet. We will schedule them for laparoscopic gastric bypass in the near future.

## 2018-04-03 NOTE — PROGRESS NOTES
TRANSFER - IN REPORT:    Verbal report received from [facility] on Estradatim Marley  being received from PACU for routine post - op      Report consisted of patients Situation, Background, Assessment and   Recommendations(SBAR). Information from the following report(s) OR Summary was reviewed with the receiving nurse. Opportunity for questions and clarification was provided. 1315 Received pt via bed awake and alert in NAD. Lap sites to abdomen d/i. Wearing O2 at 2L via n./c. Ambulated to BR with assist. Tolerated well. Oriented to call bell, phone and IS with pt giving return demonstration. Ice pack and Ice chips given.

## 2018-04-03 NOTE — PERIOP NOTES
Called out to family waiting. Spoke with pt Jose M rios. Updated on progress of procedure and pt status.

## 2018-04-03 NOTE — INTERVAL H&P NOTE
H&P Update:  Flores Abad was seen and examined. History and physical has been reviewed. The patient has been examined. There have been no significant clinical changes since the completion of the originally dated History and Physical.  Patient identified by surgeon; surgical site was confirmed by patient and surgeon.     Signed By: Valeria Dobson MD     April 3, 2018 7:30 AM

## 2018-04-03 NOTE — PROGRESS NOTES
1330  Received pt from PACU, alert and oriented, looks  Drowsy, triflo demonstrated, pain management writen on the board, to  Give Tylenol first t. Ice pack to incisional site, pt  Voided already as soon as she  Came to the floor she was assisted to the bathroom. 1430  Tylenol given for pain, been using   The triflo. 1550  About to walk but she  Wants Sharon, voided, taking ice slowly,Sharon 10 mg po given, then walk later, monitor for any itching. 1900   Ambulated in the hallway with her boyfriend, pain under control with Tylenol, Sharon and Toradol.

## 2018-04-03 NOTE — ANESTHESIA POSTPROCEDURE EVALUATION
Post-Anesthesia Evaluation and Assessment    Patient: Harley Saucedo MRN: [id number]  SSN: [id number]    YOB: 1995  Age: 20 y.o. Sex: female       Cardiovascular Function/Vital Signs  Visit Vitals    /89 (BP 1 Location: Right arm, BP Patient Position: At rest)    Pulse 88    Temp 36.9 °C (98.4 °F)    Resp 23    Ht 5' 7\" (1.702 m)    Wt 135.6 kg (299 lb)    SpO2 96%    BMI 46.83 kg/m2       Patient is status post general anesthesia for Procedure(s):   LAPAROSCOPIC ashtyn-en-y GASTRIC BYPASS. Nausea/Vomiting: None    Postoperative hydration reviewed and adequate. Pain:  Pain Scale 1: Numeric (0 - 10) (04/03/18 1544)  Pain Intensity 1: 8 (04/03/18 1544)   Managed    Neurological Status:   Neuro (WDL): Within Defined Limits (04/03/18 1257)   At baseline    Mental Status and Level of Consciousness: Arousable    Pulmonary Status:   O2 Device: Nasal cannula (04/03/18 1444)   Adequate oxygenation and airway patent    Complications related to anesthesia: None    Post-anesthesia assessment completed.  No concerns    Signed By: Felicia Curry MD     April 3, 2018

## 2018-04-04 VITALS
RESPIRATION RATE: 20 BRPM | SYSTOLIC BLOOD PRESSURE: 116 MMHG | TEMPERATURE: 97.6 F | BODY MASS INDEX: 45.99 KG/M2 | HEART RATE: 79 BPM | DIASTOLIC BLOOD PRESSURE: 81 MMHG | OXYGEN SATURATION: 96 % | HEIGHT: 67 IN | WEIGHT: 293 LBS

## 2018-04-04 LAB
ANION GAP SERPL CALC-SCNC: 6 MMOL/L (ref 3–18)
BUN SERPL-MCNC: 7 MG/DL (ref 7–18)
BUN/CREAT SERPL: 11 (ref 12–20)
CALCIUM SERPL-MCNC: 8.9 MG/DL (ref 8.5–10.1)
CHLORIDE SERPL-SCNC: 105 MMOL/L (ref 100–108)
CO2 SERPL-SCNC: 29 MMOL/L (ref 21–32)
CREAT SERPL-MCNC: 0.66 MG/DL (ref 0.6–1.3)
ERYTHROCYTE [DISTWIDTH] IN BLOOD BY AUTOMATED COUNT: 13.4 % (ref 11.6–14.5)
GLUCOSE SERPL-MCNC: 100 MG/DL (ref 74–99)
HCT VFR BLD AUTO: 37.7 % (ref 35–45)
HGB BLD-MCNC: 12 G/DL (ref 12–16)
MCH RBC QN AUTO: 29.1 PG (ref 24–34)
MCHC RBC AUTO-ENTMCNC: 31.8 G/DL (ref 31–37)
MCV RBC AUTO: 91.5 FL (ref 74–97)
PLATELET # BLD AUTO: 311 K/UL (ref 135–420)
PMV BLD AUTO: 10 FL (ref 9.2–11.8)
POTASSIUM SERPL-SCNC: 4 MMOL/L (ref 3.5–5.5)
RBC # BLD AUTO: 4.12 M/UL (ref 4.2–5.3)
SODIUM SERPL-SCNC: 140 MMOL/L (ref 136–145)
WBC # BLD AUTO: 11.7 K/UL (ref 4.6–13.2)

## 2018-04-04 PROCEDURE — 85027 COMPLETE CBC AUTOMATED: CPT | Performed by: SURGERY

## 2018-04-04 PROCEDURE — C9113 INJ PANTOPRAZOLE SODIUM, VIA: HCPCS | Performed by: SURGERY

## 2018-04-04 PROCEDURE — 74011250636 HC RX REV CODE- 250/636: Performed by: SURGERY

## 2018-04-04 PROCEDURE — 74011000250 HC RX REV CODE- 250: Performed by: SURGERY

## 2018-04-04 PROCEDURE — 36415 COLL VENOUS BLD VENIPUNCTURE: CPT | Performed by: SURGERY

## 2018-04-04 PROCEDURE — 74011258636 HC RX REV CODE- 258/636: Performed by: SURGERY

## 2018-04-04 PROCEDURE — 77030020254 HC SOL INJ D5LR LACTATED RINGER

## 2018-04-04 PROCEDURE — 74011250637 HC RX REV CODE- 250/637: Performed by: SURGERY

## 2018-04-04 PROCEDURE — 80048 BASIC METABOLIC PNL TOTAL CA: CPT | Performed by: SURGERY

## 2018-04-04 RX ORDER — PANTOPRAZOLE SODIUM 40 MG/1
40 TABLET, DELAYED RELEASE ORAL
Status: DISCONTINUED | OUTPATIENT
Start: 2018-04-05 | End: 2018-04-04 | Stop reason: HOSPADM

## 2018-04-04 RX ORDER — ENOXAPARIN SODIUM 100 MG/ML
40 INJECTION SUBCUTANEOUS DAILY
Qty: 7 SYRINGE | Refills: 0 | Status: SHIPPED
Start: 2018-04-04 | End: 2018-04-18 | Stop reason: ALTCHOICE

## 2018-04-04 RX ORDER — OXYCODONE HYDROCHLORIDE 5 MG/1
5-10 TABLET ORAL
Qty: 20 TAB | Refills: 0 | Status: SHIPPED | OUTPATIENT
Start: 2018-04-04 | End: 2018-04-18 | Stop reason: ALTCHOICE

## 2018-04-04 RX ADMIN — DULOXETINE HYDROCHLORIDE 120 MG: 60 CAPSULE, DELAYED RELEASE ORAL at 09:07

## 2018-04-04 RX ADMIN — SODIUM CHLORIDE, SODIUM LACTATE, POTASSIUM CHLORIDE, CALCIUM CHLORIDE, AND DEXTROSE MONOHYDRATE 150 ML/HR: 600; 310; 30; 20; 5 INJECTION, SOLUTION INTRAVENOUS at 05:01

## 2018-04-04 RX ADMIN — ACETAMINOPHEN 650 MG: 325 TABLET, FILM COATED ORAL at 09:07

## 2018-04-04 RX ADMIN — KETOROLAC TROMETHAMINE 30 MG: 30 INJECTION, SOLUTION INTRAMUSCULAR at 01:04

## 2018-04-04 RX ADMIN — ENOXAPARIN SODIUM 40 MG: 40 INJECTION SUBCUTANEOUS at 09:08

## 2018-04-04 RX ADMIN — KETOROLAC TROMETHAMINE 30 MG: 30 INJECTION, SOLUTION INTRAMUSCULAR at 05:59

## 2018-04-04 RX ADMIN — LAMOTRIGINE 400 MG: 100 TABLET ORAL at 09:07

## 2018-04-04 RX ADMIN — SODIUM CHLORIDE 40 MG: 9 INJECTION, SOLUTION INTRAMUSCULAR; INTRAVENOUS; SUBCUTANEOUS at 09:07

## 2018-04-04 NOTE — PROGRESS NOTES
Surgery Progress Note    4/4/2018    Admit Date: 4/3/2018    Subjective:     Patient has complaints of nothing. Pain is controlled with current regimen. Patient has been ambulating in halls. She reports no nausea and no vomiting and is tolerating ice chips well. Objective:     Blood pressure 113/72, pulse 84, temperature 97.8 °F (36.6 °C), resp. rate 20, height 5' 7\" (1.702 m), weight 135.6 kg (299 lb), SpO2 100 %. 04/02 1901 - 04/04 0700  In: 2946 [P.O.:300; I.V.:5510]  Out: 2360 [Urine:2350]    EXAM: GENERAL: alert, pleasant, no distress   HEART: regular rate and rhythm   LUNGS: clear to auscultation   ABDOMEN:  Soft, obese, appropriately tender, non distended, incisions clean, dry, no erythema or drainage   EXTREMITIES: warm, well perfused    Data Review    Recent Results (from the past 24 hour(s))   CBC W/O DIFF    Collection Time: 04/04/18  5:10 AM   Result Value Ref Range    WBC 11.7 4.6 - 13.2 K/uL    RBC 4.12 (L) 4.20 - 5.30 M/uL    HGB 12.0 12.0 - 16.0 g/dL    HCT 37.7 35.0 - 45.0 %    MCV 91.5 74.0 - 97.0 FL    MCH 29.1 24.0 - 34.0 PG    MCHC 31.8 31.0 - 37.0 g/dL    RDW 13.4 11.6 - 14.5 %    PLATELET 380 355 - 810 K/uL    MPV 10.0 9.2 - 76.9 FL   METABOLIC PANEL, BASIC    Collection Time: 04/04/18  5:10 AM   Result Value Ref Range    Sodium 140 136 - 145 mmol/L    Potassium 4.0 3.5 - 5.5 mmol/L    Chloride 105 100 - 108 mmol/L    CO2 29 21 - 32 mmol/L    Anion gap 6 3.0 - 18 mmol/L    Glucose 100 (H) 74 - 99 mg/dL    BUN 7 7.0 - 18 MG/DL    Creatinine 0.66 0.6 - 1.3 MG/DL    BUN/Creatinine ratio 11 (L) 12 - 20      GFR est AA >60 >60 ml/min/1.73m2    GFR est non-AA >60 >60 ml/min/1.73m2    Calcium 8.9 8.5 - 10.1 MG/DL       Assessment:   Onelia Ramsey is a 20 y.o. female, postop day 1 status post laparoscopic gastric bypass surgery.   Condition: good    Plan:     -Ambulate every four hours  -Oxycodone 5mg 1-2 tabs po every 4-6 hour prn pain uncontrolled by tylenol  -Advance to Clear liquid Gastric Bypass Diet, if able to tolerate clear liquid diet 4oz per hour one of which being a protein supplement, will discharge home later today

## 2018-04-04 NOTE — PROGRESS NOTES
OOB sitting in chair with family at side. No acute distress noted, tolerating bariatric clear diet. Safety measures in place, call bell in reach. @ 1030  Ambulated in hallway with minimal assist, tolerated    @ [phone] Goals met, ready for discharge per Mary Norman RN.

## 2018-04-04 NOTE — PROGRESS NOTES
2034: Received patient in bed awake,alert and oriented x4. No signs of dsitress. Bed low and locked. Call bell within reach. Will monitor. 0630: In bed resting quietly,uneventful night,call bell within reach. Will monitor.

## 2018-04-04 NOTE — DISCHARGE INSTRUCTIONS
Discharge Diet:  Clear Liquid Bariatric Diet for 7 days, then soft moist protein for 5 weeks      Discharge Medications:   *All medications as per Medicatoin Reconciliation Form*    Flintstones Complete Chewable vitamins, 2 orally daily for life  Calcium Citrate 2000mg orally daily for life  Vitamin B12 1000micrograms sublingual daily for life  Oxycodone 5mg tab, 1-2 by mouth every 4-6 hours as needed for pain not controlled with Tylenol  Enoxaparin (Lovenox) 40mg sub-Q daily for 7 days        Local wound care with daily showers, keep wounds clean and dry    Activity: as desired, no lifting greater than 15lbs or situps for 30 days    Special Instructions:   No driving until activity is not influenced by incisional pain and off narcotics   No bath or hot tub until wounds are healed   Pulse and temperature twice daily for 10 days   Notify [facility] for a Temp >100.5 or Pulse>115    Followup with surgeon in 2 weeks        DISCHARGE SUMMARY from Nurse    PATIENT INSTRUCTIONS:    After general anesthesia or intravenous sedation, for 24 hours or while taking prescription Narcotics:  · Limit your activities  · Do not drive and operate hazardous machinery  · Do not make important personal or business decisions  · Do  not drink alcoholic beverages  · If you have not urinated within 8 hours after discharge, please contact your surgeon on call. Report the following to your surgeon:  · Excessive pain, swelling, redness or odor of or around the surgical area  · Temperature over 100.5  · Nausea and vomiting lasting longer than 4 hours or if unable to take medications  · Any signs of decreased circulation or nerve impairment to extremity: change in color, persistent  numbness, tingling, coldness or increase pain  · Any questions    What to do at Home:  Recommended activity: See surgeon's instructions above.     If you experience any of the following symptoms severe pain, nausea and vomiting, fever above 100.5, bleeding or drainage from incisions, shortness of breath, please follow up with . *  Please give a list of your current medications to your Primary Care Provider. *  Please update this list whenever your medications are discontinued, doses are      changed, or new medications (including over-the-counter products) are added. *  Please carry medication information at all times in case of emergency situations. These are general instructions for a healthy lifestyle:    No smoking/ No tobacco products/ Avoid exposure to second hand smoke  Surgeon General's Warning:  Quitting smoking now greatly reduces serious risk to your health. Obesity, smoking, and sedentary lifestyle greatly increases your risk for illness    A healthy diet, regular physical exercise & weight monitoring are important for maintaining a healthy lifestyle    You may be retaining fluid if you have a history of heart failure or if you experience any of the following symptoms:  Weight gain of 3 pounds or more overnight or 5 pounds in a week, increased swelling in our hands or feet or shortness of breath while lying flat in bed. Please call your doctor as soon as you notice any of these symptoms; do not wait until your next office visit. Recognize signs and symptoms of STROKE:    F-face looks uneven    A-arms unable to move or move unevenly    S-speech slurred or non-existent    T-time-call 911 as soon as signs and symptoms begin-DO NOT go       Back to bed or wait to see if you get better-TIME IS BRAIN. Warning Signs of HEART ATTACK     Call 911 if you have these symptoms:   Chest discomfort. Most heart attacks involve discomfort in the center of the chest that lasts more than a few minutes, or that goes away and comes back. It can feel like uncomfortable pressure, squeezing, fullness, or pain.  Discomfort in other areas of the upper body.  Symptoms can include pain or discomfort in one or both arms, the back, neck, jaw, or stomach.  Shortness of breath with or without chest discomfort.  Other signs may include breaking out in a cold sweat, nausea, or lightheadedness. Don't wait more than five minutes to call 911 - MINUTES MATTER! Fast action can save your life. Calling 911 is almost always the fastest way to get lifesaving treatment. Emergency Medical Services staff can begin treatment when they arrive -- up to an hour sooner than if someone gets to the hospital by car. The discharge information has been reviewed with the patient. The patient verbalized understanding. Discharge medications reviewed with the patient and appropriate educational materials and side effects teaching were provided. Patient armband removed and shredded.   ___________________________________________________________________________________________________________________________________

## 2018-04-04 NOTE — PROGRESS NOTES
Problem: Falls - Risk of  Goal: *Absence of Falls  Document Gali Fall Risk and appropriate interventions in the flowsheet.    Outcome: Progressing Towards Goal  Fall Risk Interventions:  Mobility Interventions: Patient to call before getting OOB         Medication Interventions: Patient to call before getting OOB, Teach patient to arise slowly    Elimination Interventions: Call light in reach, Patient to call for help with toileting needs, Toilet paper/wipes in reach

## 2018-04-04 NOTE — PROGRESS NOTES
Care Management Interventions  PCP Verified by CM: Yes  Palliative Care Criteria Met (RRAT>21 & CHF Dx)?: No  Mode of Transport at Discharge: Self  Transition of Care Consult (CM Consult): Discharge Planning  Discharge Durable Medical Equipment: No  Physical Therapy Consult: No  Occupational Therapy Consult: No  Speech Therapy Consult: No  Current Support Network:  (boy friend)  Confirm Follow Up Transport: Family  Plan discussed with Pt/Family/Caregiver: Yes  Discharge Location  Discharge Placement: Home     Spoke with patient in room, she lives with her BF. She was independent prior to admission and requires no DME's. She verified her demographics, insurance and PCP listed is no longer accurate ( she has no PCP  consult ordered)   on the facesheet. Patient has designated ___BF_____________________ to participate in his/her discharge plan and to receive any needed information. Karel Quintero [phone] she plans to return home upon discharge and family to provide transport.     Reason for Admission:        RRAT Score:        Plan for utilizing home health:         Likelihood of Readmission:

## 2018-04-04 NOTE — PROGRESS NOTES
Asleep easily awakened. No complaints  is at bedside. Patient was educated on progression of diet this AM. Goal of 4 ounces per hour with one ounce being protein was clearly understood. Patient was instructed to go slow with small sips to reach goal. Patient given a report card to record intake. Education completed on I.S use and to ambulate in fallon at least 4 times. Will follow up and check progression.

## 2018-04-04 NOTE — PROGRESS NOTES
Clinical Pharmacy Note: IV to PO Automatic Conversion    Please note: Onelia Ariza medication (pantoprazole 40 mg) has been changed from IV to PO based on the following critiera:    - Patient is taking scheduled oral medications  - Patient is tolerating tube feeds at goal rate or a full liquid, soft or regular diet    This IV to PO conversion is based on the [employer] approved automatic conversion policy for eligible patients. Please call with questions.     Thanks,  Carlos Xiao, PHARMD

## 2018-04-04 NOTE — PROGRESS NOTES
Care Management Interventions  PCP Verified by CM:  Yes  Palliative Care Criteria Met (RRAT>21 & CHF Dx)?: No  Mode of Transport at Discharge: Self  Transition of Care Consult (CM Consult): Discharge Planning  Discharge Durable Medical Equipment: No  Physical Therapy Consult: No  Occupational Therapy Consult: No  Speech Therapy Consult: No  Current Support Network:  (boy friend)  Confirm Follow Up Transport: Family  Plan discussed with Pt/Family/Caregiver: Yes  Discharge Location  Discharge Placement: Home

## 2018-04-04 NOTE — PROGRESS NOTES
Goals met, patient has home medications. Post op diet progression discussed with patient. Patient to be discharged on a bariatric clear liquid diet. Patient verbalizes understanding of bariatric clear liquid and bariatric soft and moist diet. All of the patients questions and concerns have been addressed prior to discharge. Patient to follow up with surgeon in 7-14 days. Lovenox self injection instructions given. General Care after Surgery    1. No lifting over 15 lbs for 4 weeks. 2. No driving while taking the pain medication (approximately 7-10 days). 3. No tub baths, swimming or hot tubs until incisions are healed. (about 2 weeks)    4. You may shower. Clean incisions daily /gently with soap and check incisions for signs of infection:   Redness around incision   Swelling at site   Drainage with an foul odor (pus)   Increase tenderness around incision    5. Take your temperature and resting pulse in the morning and evening. Record on tracking form given to you. Call if your temperature is greater than 101 or your pulse rate is greater than 115.    6. Please contact your surgeon if you are having excessive abdominal pain (that lasts longer than 4 hours and does not improve with prescribed pain medication), vomiting or shortness of breath. 7. Get up and move - do not sit in one place for more than an hour. 8. You need to WALK (EXERCISE) for 30 minutes per day. (Walking around your house does not count)      a. Bike, treadmill and elliptical are OK  b. NO weight lifting or sit ups    9. If constipated take an adult dose of Miralax (available over the counter). Contact the doctors office if Hannah lax doesn'tT help.     10.  You may swallow pills starting the day after surgery as long as they fit inside this [place]:                                            11.  Continue to use your incentive spirometer (breathing machine) for the next couple of weeks to help prevent pneumonia. Phone numbers: [facility] at [address]  [address] [facility] at [address] phone #: [phone]  Yeny Rivera phone# [phone]   Eric Ramon phone#: [phone]  Crystals phone#: [phone]    Key Diet Principles Following Bariatric Surgery     1. Begin each meal with soft moist high protein foods (i.e. chicken, turkey, yogurt, tuna, eggs, cottage cheese, other fish and seafood). 2.  Consume a minimum of 64 oz. of fluid each day to prevent dehydration. No straws. 3.  No food and fluid together. Stop drinking 30 minutes before a meal.  You may begin fluids again 30 minutes after you finish a meal.    4.  Eat very slowly and chew all foods completely. 6.  Keep portions small. 7.  No simple sugars or high fat foods. No carbonated beverages. No Caffeine. 8.  Eat 3 meals per day. No skipping. Avoid snacking between meals. 9.  No alcohol. No Smoking. 10. Two [place] Complete Chewable vitamins each day. Take one in the morning and one at night. 11. 1500 mg Calcium Citrate per day in separate dosages      12. Vitamin D 3: 5000 IU taken per day. 13. Vitamin B-12:  Take 1000 mcg sublingually daily    14. Iron: 60 mg per day for women menstruating    15. Protein supplements of your choice. Must be low sugar (0-3 gm), low fat    (0-3 gm) and provide at least 35-40 gm of protein each day. You need a total  (food + supplements) of 60 - 70 grams of protein each day   16. Minimum of 30 minutes of physical activity daily. 16. Do not take NSAID or Steroids without your surgeons permission. [facility] Gastric Bypass [facility] Sleeve Dietary Progression    Patient Name: A.S      Date of Surgery:  4/3    Ice Chips start once admitted on floor.  4/3     Begin Bariatric Clear Liquid Diet on: 4/4    Clear Liquid Diet: 64 oz. of fluid per day  o Low calorie, low sugar, non-carbonated beverages  - Water, Crystal Light, Propel Water, Sugar Free Jell-O, Sugar Free Popsicles, Bouillon  - Start protein supplement during this stage. (60-70 grams per day)  - Getting your fluid in and staying hydrated is your #1 priority! - The clear liquid diet will last for 7 days. Begin Bariatric Soft and Moist on: 4/12  - This stage of the diet will last for 5 weeks, unless otherwise instructed by your surgeon. - Begin:  1 week post-op   - End:  6 weeks post-op (or when you follow up with the Registered Dietitian)    - Soft, moist, high protein foods: 3 meals per day plus protein supplements. o   Portions should emphasize on soft protein. o Portions will be a MAXIMUM of:  o  1 ounce of solid food  o  2-3 ounces of cottage cheese and yogurt. o Protein supplements should be between meals and provide 30-40 grams per day during soft protein diet. o Continue to get 64 ounces of fluid in per day. - Protein foods that are ok on the Soft and Moist Diet include:  o Slow transition:  o 1st week on soft protein should focus on: Yogurt, cottage cheese, eggs, vegetarian refried beans, black beans, kidney beans, white beans. NO BAKED BEANS  o 2nd -4th  week on soft protein diet should focus on: yogurt, cottage cheese, eggs, canned tuna, canned chicken, tilapia, fish (needs to be soft enough to be cut up with a fork)  o 5th week on soft protein diet should focus on: Yogurt, cottage cheese, eggs, canned tuna, canned chicken, tilapia, fish, salmon, chicken breast, or turkey. Remember to continue to get 64 ounces of fluid daily on ALL Stages.     To be advanced to Bariatric Maintenance Stage of the bariatric diet, follow up with the dietitian 6 weeks post-op, around:                   TEMPERATURE/HEART RATE LOG  WEEK 1  Day Date Morning temperature Morning heart rate Evening temperature Evening heart rate   1        2        3        4        5        6        7          WEEK 2  Day Date Morning temperature Morning heart rate Evening temperature Evening heart rate   1        2        3        4        5        6        7          Instructions: Take your temperature and heart rate (pulse) twice a day for 14 days. Take both in the morning and evening at about the same time each day (when you wake up and before you go to bed when you are relaxed)  Please contact your doctors office if your:  ? Temperature is higher than 101°  ? Heart rate (pulse) is higher than 115 beats per minute    (normal heart rate is  beats per minute)    [facility]  [phone]  [place]:    [phone]      HOW TO TAKE YOUR HEART RATE (PULSE)       How to do it:  1. Turn your left hand so that your palm is face-up. 2. With the index and middle fingers of your right hand, draw a line from the base of your thumb to just below the crease in your wrist. Your fingers should nestle just to the left of the large tendon that pops up when you bend your wrist toward you. 3. DonT press too hard, that will make the pulse go away. Use gentle pressure. 4. Wait. It can take several seconds--and several micro-adjustments in the placement of your two fingers on your wrist--to find your pulse. Just keep moving your fingers down or up your wrist in small increments (and pausing for a few seconds) until you find it. 5. To take your pulse rate:  1. Find a watch with a second hand and place it on your right wrist or on the table next to your left hand. 2. After finding your pulse, count the number of beats for 20 seconds. 3. Multiply by 3 to get your heart rate, or beats per minute (or just count for 60 seconds for a math-free option). 4. Normal, resting heart rate is about  beats per minute. Remember to keep all you follow up appointments and to have your labs drawn. If you experience Nausea or Vomiting ask yourself these questions. 1. Did I just eat or drink something I was not suppose too ? Did it have too much sugar or fat ? 2.  How much fluid did I drink today and yesterday? Was it 64 ounces? Am I getting dehydrated? 3. Did I take to big of a bite and did I chew all my   food properly ? If you did everything you were suppose to do and still experience nausea or vomiting contact you surgeon.

## 2018-04-09 NOTE — DISCHARGE SUMMARY
Bariatric Surgery Discharge Progress Note    Admission Date: 4/3/2018    Discharge Date: 4/4/2018      Admission Diagnosis:    Clinically severe Obesity    Comorbidities:  obstructive sleep apnea - CPAP    Discharge Diagnosis:     Clinically Severe Obesity, s/p laparoscopic gastric bypass surgery with comorbidities as listed above    Procedures:   laparoscopic gastric bypass surgery    Postop Complications: none    Hospital Course:  Patient was admitted on 4/3/2018 for scheduled bariatric surgery. Operation was without significant complication. Patient admitted to the floor postoperatively, monitored as per protocol. Diet sequentially advanced beginning POD 1, pain medications transitioned to oral during the hospital course. At the time of discharge, the patient is afebrile, vital signs stable, tolerating a clear liquid diet with protein supplementation, voiding spontaneously, ambulatory with adequate pain control with oral medications and clear surgical sites without evidence of infection.     Discharge Diet:  Clear Liquid Bariatric Diet for 7 days, then soft moist protein diet for 5 weeks    Discharge Medications:   *All medications as per Medical Reconciliation Form\"    Flintstones Complete Chewable vitamins, 2 orally daily for life  Calcium Citrate 2000mg orally daily for life  Vitamin B12 1000micrograms sublingual daily for life  Oxycodone 5mg tab 1-2 by mouth every 4-6 hours as needed for pain uncontrolled with Tylenol  Enoxaparin (Lovenox) 40mg sub-Q daily for 7 days    Discharge disposition: home        Local wound care with daily showers, keep wounds clean and dry    Activity: as desired, no lifting greater than 15lbs or situps for 30 days    Special Instructions:   No driving until activity is not influenced by incisional pain and off narcotics   No bath or hot tub until wounds are healed   Pulse and temperature twice daily for 10 days   Notify [employer] for a Temp >100.5 or Pulse>115    Followup with surgeon in 2 weeks

## 2018-04-18 ENCOUNTER — OFFICE VISIT (OUTPATIENT)
Dept: SURGERY | Age: 23
End: 2018-04-18

## 2018-04-18 VITALS
HEART RATE: 97 BPM | WEIGHT: 288.6 LBS | TEMPERATURE: 97.4 F | RESPIRATION RATE: 20 BRPM | HEIGHT: 67 IN | SYSTOLIC BLOOD PRESSURE: 125 MMHG | DIASTOLIC BLOOD PRESSURE: 81 MMHG | OXYGEN SATURATION: 96 % | BODY MASS INDEX: 45.3 KG/M2

## 2018-04-18 DIAGNOSIS — K91.2 POSTOPERATIVE MALABSORPTION: Primary | ICD-10-CM

## 2018-04-18 NOTE — PROGRESS NOTES
Subjective:      Felipa Canavan is a 20 y.o. female is now 2 weeks status post laparoscopic gastric bypass surgery. Doing well overall. Currently on a soft moist protein diet without difficulty. Taking in 60+ oz water daily. Sources of protein include tuna, chicken shakes, eggs, cottage cheese. Exercise is walking . Bowel movements are alternating constipation and regularity. The patient is compliant with multivitamins, calcium, Vit D and B12 supplements. Weight Loss Metrics 4/18/2018 4/3/2018 3/29/2018 3/21/2018 2/8/2018 2/8/2018 12/15/2017   Pre op / Initial Wt - - - - 310.4 - -   Today's Wt 288 lb 9.6 oz - 299 lb 309 lb - 310 lb 6.4 oz 310 lb   BMI 45.2 kg/m2 46.83 kg/m2 - 48.4 kg/m2 - 48.62 kg/m2 48.55 kg/m2   Ideal Body Wt - - - - 140 - -   Excess Body Wt - - - - 170.4 - -   Goal Wt - - - - 174.08 - -   Wt loss to date - - - - 0 - -   % Wt Loss - - - - 0 - -   80% EBW - - - - 136.32 - -       Body mass index is 45.2 kg/(m^2).     Comorbidities:    Hypertension: not applicable  Diabetes: not applicable  Obstructive Sleep Apnea: unchanged  Hyperlipidemia: not applicable  Stress Urinary Incontinence: not applicable  Gastroesophageal Reflux: not applicable  Weight related arthropathy:not applicable        Past Medical History:   Diagnosis Date    Bipolar 1 disorder, depressed (HCC)     Chronic pain     Depression     GERD (gastroesophageal reflux disease)     Headache(784.0)     Hypercholesterolemia     IBS (irritable bowel syndrome)     Keratosis pilaris     PCOS (polycystic ovarian syndrome)     Right bundle branch block     Sciatic nerve injury     Scoliosis     Sleep apnea      Very mild    [facility])     Tailbone injury     Tricuspid valve disorder     Ruiz-Parkinson-White syndrome        Past Surgical History:   Procedure Laterality Date    HX ANGIOPLASTY      HX GASTRIC BYPASS  04/03/2018    HX HEART CATHETERIZATION      Ablation of WPW    HX TONSILLECTOMY      x 2       Current Outpatient Prescriptions   Medication Sig Dispense Refill    oxyCODONE IR (ROXICODONE) 5 mg immediate release tablet Take 1-2 Tabs by mouth every six (6) hours as needed. Max Daily Amount: 40 mg. 20 Tab 0    naloxone 2 mg/actuation spry Use 1 spray intranasally into 1 nostril. Use a new Narcan nasal spray for subsequent doses and administer into alternating nostrils. May repeat every 2 to 3 minutes as needed. 4 Actuation(s) 0    DULoxetine (CYMBALTA) 60 mg capsule Take 120 mg by mouth daily.  cholecalciferol (VITAMIN D3) 1,000 unit tablet Take 1,000 Units by mouth daily.  spironolactone (ALDACTONE) 50 mg tablet Take 50 mg by mouth daily.  PEDIATRIC MULTIVITAMIN NO.49 (FLINTSTONES GUMMIES PO) Take  by mouth.  lithium carbonate 300 mg tablet Take 900 mg by mouth nightly.  calcium citrate tab tablet Take  by mouth daily.  lamoTRIgine (LAMICTAL) 200 mg tablet Take 400 mg by mouth daily.  clonazePAM (KLONOPIN) 0.5 mg tablet Take 0.5 mg by mouth as needed.  loratadine (CLARITIN) 10 mg tablet Take 1 Tab by mouth daily. 30 Tab 3    enoxaparin (LOVENOX) 40 mg/0.4 mL 0.4 mL by SubCUTAneous route daily. 7 Syringe 0       Allergies   Allergen Reactions    Abilify [Aripiprazole] Anaphylaxis    Dilaudid [Hydromorphone (Bulk)] Itching     Pt reports she became delusion after getting this med.  Hydrocodone Itching     All the hydrocodones      Morphine Itching    Other Medication Hives     Polyester     Roxicet [Oxycodone-Acetaminophen] Itching    Vicodin [Hydrocodone-Acetaminophen] Itching     Itching was severe and she became confused.          Objective:     Visit Vitals    /81 (BP 1 Location: Right arm, BP Patient Position: Sitting)    Pulse 97    Temp 97.4 °F (36.3 °C) (Oral)    Resp 20    Ht 5' 7\" (1.702 m)    Wt 130.9 kg (288 lb 9.6 oz)    SpO2 96%    BMI 45.2 kg/m2       General:  alert, cooperative, no distress, appears stated age   Chest: lungs clear to auscultation   Cor:   Regular rate and rhythm   Abdomen: soft, bowel sounds active, non-tender   Incisions:   healing well, no drainage, some mild separation of skin (patient states she has been picking at them)       Labs: none new    Assessment:     Doing well postoperatively.     Plan:     Increase activity to the goal of 30 minutes daily, Follow up labs as ordered, Increase fluids, Follow up with Registered Dietician and Continue MVI/Ca/B12 supplementation  Encouraged to attend support group  Follow up in 3 months

## 2018-04-18 NOTE — PROGRESS NOTES
1. Have you been to the ER, urgent care clinic since your last visit? Hospitalized since your last visit? No    2. Have you seen or consulted any other health care providers outside of the [address] [place] [address] since your last visit? Include any pap smears or colon screening. No      Patient presents today for post op from Gastric bypass on 4/3/2018.

## 2018-04-18 NOTE — PATIENT INSTRUCTIONS
If you have any questions or concerns about today's appointment, the verbal and/or written instructions you were given for follow up care, please call our office at [phone].     [facility] - [address]    [phone] office  [phone]

## 2018-05-08 ENCOUNTER — DOCUMENTATION ONLY (OUTPATIENT)
Dept: SURGERY | Age: 23
End: 2018-05-08

## 2018-05-08 NOTE — PROGRESS NOTES
[facility]  POST-OP NUTRITION FOLLOW UP    Patient's Name: Bessy Sultana  YOB: 1995  Surgery Date: 04/03/2018    Procedure: LGBP  Surgeon: Benjamin    Height: 5'7\"   Pre-Op Weight: 310   Current Weight: 277#  Weight Lost: 15#      Complications  Readmittance: no  Reoperations: no  Complications: no  IV Fluids: no  ER Trips: no    Problem Areas:   Nausea: no   Vomiting: once, drank too quickly   Dumping Syndrome: no  Inadequate Protein: no  Inadequate Fluids: no  Food Intolerance: no  Hunger: yes,   Constipation: yes,     Eating 3 Meals/Day:yes  Portion Size at Meals: 1-2   Protein from Food: 30    Foods being consumed:  Breakfast: Time:noon  eggs  Lunch: Time: 3:00   Tuna, chicken, chicken salad  Dinner:  Time: 7:00   Tuna, chicken,   In-between eating: protein drink. Length of time for meals: 20-30  food/fluids: yes    Fluids: >64 oz/day Types of Fluids: water,protein drink. MVI: Equate MVI [place]  Number/Day: 2 Taken Separately: yes    Calcium: 400  Calcium Dosing: once  Taken Separately: Patient needed input    Vitamin B12: 1000 Vitamin B12 Dosing: daily    Vitamin D: 5000 iu   Vitamin D dosing: daily         Protein Supplement: Protein   Grams of Protein: 25 times two     Patient is taking 7 days a week. Exercise (FITT): walking,       Comments:    Patient needed to add iron. Patient was educated on the importance of eating meat and vegetables only. I have talked with patient about the effects of carbohydrates, not only from a weight management perspective, but also what effects it could have on their blood sugar and what reactive hypoglycemia is.         Diet Follow Up:3 and  9 month class scheduled for     [facility], RD    5/8/2018

## 2018-06-11 ENCOUNTER — TELEPHONE (OUTPATIENT)
Dept: SURGERY | Age: 23
End: 2018-06-11

## 2018-06-11 NOTE — TELEPHONE ENCOUNTER
LUI on  to schedule her GBP 3 month post op appointment with Dr. Joana Taipa. Also and e-mail will be sent to patient.

## 2018-07-17 ENCOUNTER — DOCUMENTATION ONLY (OUTPATIENT)
Dept: SURGERY | Age: 23
End: 2018-07-17

## 2018-07-18 ENCOUNTER — TELEPHONE (OUTPATIENT)
Dept: SURGERY | Age: 23
End: 2018-07-18

## 2018-07-18 DIAGNOSIS — Z98.84 H/O GASTRIC BYPASS: Primary | ICD-10-CM

## 2018-07-18 NOTE — TELEPHONE ENCOUNTER
----- Message from Shahla Quigley sent at 7/18/2018 11:36 AM EDT -----  Need 3 month labs order.  Please and thank you

## 2019-03-13 ENCOUNTER — DOCUMENTATION ONLY (OUTPATIENT)
Dept: SURGERY | Age: 24
End: 2019-03-13

## 2019-03-13 NOTE — PROGRESS NOTES
Per [facility] requirements;  E-mail and letter sent for follow up appointment. [employer] [facility] [place]   [employer] [facility]  [facility]        Dear Patient,        Your health is our main concern. It is important for your health to have follow-up lab work and to see you surgeon at 3 months, 6 months and annually after your weight loss surgery. Additionally, the Department of Bariatric Surgery at our hospital is a member of the [employer] [address] Surgical Quality Improvement Program ([facility] NS[facility]). As a participant in this program, we gather information on the outcomes of our patients after surgery. Please call the office for a follow up appointment at [phone]. If you have moved out of the area or have changed surgeons please call us and let us know the name of your doctor. Your health and feedback are important to us. We greatly appreciate your response.        Thank you,  [facility] [address] [facility]

## 2019-03-13 NOTE — LETTER
[employer] [facility] [place] Dear Patient, Your health is our main concern. It is important for your health to have follow-up lab work and to see you surgeon at 3 months, 6 months and annually after your weight loss surgery. Additionally, the Department of Bariatric Surgery at our hospital is a member of the [employer] of [address] [place] [address] Surgical Quality Improvement Program ([facility] NSQIP). As a participant in this program, we gather information on the outcomes of our patients after surgery. Please call the office for a follow up appointment at [phone]. If you have moved out of the area or have changed surgeons please call us and let us know the name of your doctor. Your health and feedback are important to us. We greatly appreciate your response. Thank you, [facility]

## 2019-05-11 NOTE — PROGRESS NOTES
Patient has been called and a message left for her to begin taking an iron supplement. 60-65 mg iron BID. Patient encouraged to take with 500 mg vitamin C for better absorption. RD provided number in case of questions. Name band;

## 2021-10-02 NOTE — ROUTINE PROCESS
Bedside and Verbal shift change report given to Martha Pereira (oncoming nurse) by Glenn Chu (offgoing nurse). Report included the following information SBAR, Kardex, MAR and Recent Results.
Bedside and Verbal shift change report given to Michelle     (oncoming nurse) by Maribel Galloway RN   (offgoing nurse). Report included the following information SBAR, Kardex, Intake/Output and MAR.
35.6

## 2021-11-11 ENCOUNTER — HOSPITAL ENCOUNTER (OUTPATIENT)
Dept: LAB | Age: 26
Discharge: HOME | End: 2021-11-11

## 2021-11-11 LAB
XX-LABCORP SPECIMEN COL,LCBCF: NORMAL
XX-LABCORP SPECIMEN COL,LCBCF: NORMAL

## 2021-11-11 PROCEDURE — 99001 SPECIMEN HANDLING PT-LAB: CPT

## 2021-12-06 ENCOUNTER — HOSPITAL ENCOUNTER (OUTPATIENT)
Dept: PREADMISSION TESTING | Age: 26
Discharge: HOME | End: 2021-12-06

## 2021-12-06 VITALS — WEIGHT: 230 LBS | BODY MASS INDEX: 36.1 KG/M2 | HEIGHT: 67 IN

## 2021-12-06 RX ORDER — SODIUM CHLORIDE, SODIUM LACTATE, POTASSIUM CHLORIDE, CALCIUM CHLORIDE 600; 310; 30; 20 MG/100ML; MG/100ML; MG/100ML; MG/100ML
125 INJECTION, SOLUTION INTRAVENOUS CONTINUOUS
Status: CANCELLED | OUTPATIENT
Start: 2021-12-06

## 2021-12-06 RX ORDER — CEFAZOLIN SODIUM/WATER 2 G/20 ML
2 SYRINGE (ML) INTRAVENOUS ONCE
Status: CANCELLED | OUTPATIENT
Start: 2021-12-10 | End: 2021-12-10

## 2021-12-06 RX ORDER — CALCIUM CARBONATE/VITAMIN D3 500 MG-10
TABLET ORAL DAILY
COMMUNITY

## 2021-12-06 RX ORDER — DESVENLAFAXINE 100 MG/1
TABLET, EXTENDED RELEASE ORAL
COMMUNITY

## 2021-12-06 RX ORDER — LEVOTHYROXINE SODIUM 100 UG/1
TABLET ORAL
COMMUNITY

## 2021-12-06 RX ORDER — AMITRIPTYLINE HYDROCHLORIDE 25 MG/1
TABLET, FILM COATED ORAL
COMMUNITY

## 2021-12-06 RX ORDER — ETONOGESTREL 68 MG/1
IMPLANT SUBCUTANEOUS
COMMUNITY

## 2021-12-06 RX ORDER — LORAZEPAM 1 MG/1
TABLET ORAL
COMMUNITY

## 2021-12-06 RX ORDER — LANOLIN ALCOHOL/MO/W.PET/CERES
1000 CREAM (GRAM) TOPICAL DAILY
COMMUNITY

## 2021-12-06 NOTE — PERIOP NOTES
Leave all valuables at home or loved ones;to include wallets/purse, money/credit cards, electronics  such as laptops and tablets. If you want to have your prescriptions filled here, please have some form of payment with your . Please arrange for your transportation home Denies any prosthetics. Patient states family physician is aware of upcoming procedure/surgery. Stop nsaids 7 days prior to [place]. Denies sleep apnea. Denies family history of anesthesia complications. Please be aware that due to unforeseen circumstances, delays may occur and your patience will be appreciated. If you ae scheduled to be discharged the same day, please plan to be with us for most of the day. If an inpatient, room assignments may be delayed as well. Our priority is to make you as comfortable as possible and to keep your family informed of your status when possible. Denies shortness of breath nor chest pain while climbing stairs. H/o svt, wpw, bipolar. No dnr.  Requested last office note from cardio/chkd; labs from [address] [place] [address]

## 2021-12-08 NOTE — H&P
[facility]  HISTORY AND PHYSICAL    Name:  Blanca Prince  MR#:   [id number]  :  1995  ACCOUNT #:  [de-identified]  ADMIT DATE:  12/10/2021    HISTORY OF PRESENT ILLNESS:  This is a 58-year-old with pain in the left heel, which has become progressively worse. She has been unresponsive to conservative treatment, immobilization, injection therapy and taping. She presents today for scheduled surgical management of her painful left foot. PAST MEDICAL HISTORY:  Includes hypothyroidism and obesity. PAST SURGICAL HISTORY:  Unremarkable to the chief complaint. CURRENT MEDICATIONS:  Include,  1. Amitriptyline. 2.  Diclofenac. 3.  Gabapentin. 4.  Lamotrigine. 5.  Levothyroxine. 6.  Lorazepam.  7.  Temazepam.    ALLERGIES:  ABILIFY AND HYDROCODONE.    SOCIAL HISTORY:  Denies tobacco products. Relates to social alcohol use. FAMILY HISTORY:  Unremarkable. PHYSICAL EXAMINATION:  VASCULAR:  Dorsalis pedis and posterior tibial pulses are palpable. Capillary refill time less than 3 seconds. NEUROLOGIC EXAMINATION:  All epicritic sensations are intact. There is some pain on palpation in the plantar medial aspect of the left heel and directly plantar. No pain on lateral compression. There is a tight medial band of plantar fascia noted. Also, noted a tight Achilles tendon, I am able to get her to neutral.  Ankle joint range of motion is pain-free, but restricted with dorsiflexion secondary to ankle equinus. IMPRESSION:  1. Ankle equinus. 2.  Plantar fasciitis. PLAN:  I have consulted with the patient. We have discussed options and alternatives, and recommendations were given. She was given a full surgical consultation regarding Achilles tendon lengthening and endoscopic plantar fasciotomy. All risks, benefits, and alternatives were explained and all pre, jw, and postoperative course were discussed.   She was given the opportunity to ask questions, and all questions were answered. The consent was reviewed. All risks were discussed. It is signed and on the chart. She was given a prescription for Keflex 500 mg one p.o. b.i.d., which she will begin preoperatively and one for Percocet one p.o. q.4-6 hours p.r.n. pain. We will follow in the office for postoperative management.       Tara Muir DPM      [id number]  D:  12/08/2021 7:43  T:  12/08/2021 10:26  JOB #:  [id number]  l

## 2021-12-09 ENCOUNTER — ANESTHESIA EVENT (OUTPATIENT)
Dept: SURGERY | Age: 26
End: 2021-12-09
Payer: MEDICAID

## 2021-12-10 ENCOUNTER — ANESTHESIA (OUTPATIENT)
Dept: SURGERY | Age: 26
End: 2021-12-10
Payer: MEDICAID

## 2021-12-10 ENCOUNTER — HOSPITAL ENCOUNTER (OUTPATIENT)
Age: 26
Setting detail: OUTPATIENT SURGERY
Discharge: HOME | End: 2021-12-10
Attending: PODIATRIST | Admitting: PODIATRIST
Payer: MEDICAID

## 2021-12-10 VITALS
DIASTOLIC BLOOD PRESSURE: 59 MMHG | TEMPERATURE: 96.5 F | BODY MASS INDEX: 39.31 KG/M2 | OXYGEN SATURATION: 100 % | HEART RATE: 103 BPM | SYSTOLIC BLOOD PRESSURE: 101 MMHG | WEIGHT: 250.5 LBS | RESPIRATION RATE: 16 BRPM | HEIGHT: 67 IN

## 2021-12-10 PROBLEM — M72.2 PLANTAR FASCIITIS: Status: ACTIVE | Noted: 2021-12-10

## 2021-12-10 PROBLEM — M24.573 EQUINUS CONTRACTURE OF ANKLE: Status: ACTIVE | Noted: 2021-12-10

## 2021-12-10 LAB — HCG UR QL: NEGATIVE

## 2021-12-10 PROCEDURE — 74011250636 HC RX REV CODE- 250/636: Performed by: PODIATRIST

## 2021-12-10 PROCEDURE — 77030000032 HC CUF TRNQT ZIMM -B: Performed by: PODIATRIST

## 2021-12-10 PROCEDURE — 74011250636 HC RX REV CODE- 250/636: Performed by: ANESTHESIOLOGY

## 2021-12-10 PROCEDURE — 81025 URINE PREGNANCY TEST: CPT

## 2021-12-10 PROCEDURE — 74011250636 HC RX REV CODE- 250/636: Performed by: NURSE ANESTHETIST, CERTIFIED REGISTERED

## 2021-12-10 PROCEDURE — 77030002933 HC SUT MCRYL J&J -A: Performed by: PODIATRIST

## 2021-12-10 PROCEDURE — 76060000032 HC ANESTHESIA 0.5 TO 1 HR: Performed by: PODIATRIST

## 2021-12-10 PROCEDURE — 64447 NJX AA&/STRD FEMORAL NRV IMG: CPT | Performed by: PODIATRIST

## 2021-12-10 PROCEDURE — 76010000138 HC OR TIME 0.5 TO 1 HR: Performed by: PODIATRIST

## 2021-12-10 PROCEDURE — 2709999900 HC NON-CHARGEABLE SUPPLY: Performed by: PODIATRIST

## 2021-12-10 PROCEDURE — 74011000250 HC RX REV CODE- 250: Performed by: PODIATRIST

## 2021-12-10 PROCEDURE — 76210000021 HC REC RM PH II 0.5 TO 1 HR: Performed by: PODIATRIST

## 2021-12-10 PROCEDURE — 77030002922 HC SUT FBRWRE ARTH -B: Performed by: PODIATRIST

## 2021-12-10 PROCEDURE — 76942 ECHO GUIDE FOR BIOPSY: CPT | Performed by: PODIATRIST

## 2021-12-10 PROCEDURE — 77030002916 HC SUT ETHLN J&J -A: Performed by: PODIATRIST

## 2021-12-10 PROCEDURE — 77030020782 HC GWN BAIR PAWS FLX 3M -B: Performed by: PODIATRIST

## 2021-12-10 PROCEDURE — 74011000272 HC RX REV CODE- 272: Performed by: PODIATRIST

## 2021-12-10 PROCEDURE — 77030040361 HC SLV COMPR DVT MDII -B: Performed by: PODIATRIST

## 2021-12-10 RX ORDER — SODIUM CHLORIDE, SODIUM LACTATE, POTASSIUM CHLORIDE, CALCIUM CHLORIDE 600; 310; 30; 20 MG/100ML; MG/100ML; MG/100ML; MG/100ML
125 INJECTION, SOLUTION INTRAVENOUS CONTINUOUS
Status: DISCONTINUED | OUTPATIENT
Start: 2021-12-10 | End: 2021-12-10 | Stop reason: HOSPADM

## 2021-12-10 RX ORDER — ALBUTEROL SULFATE 0.83 MG/ML
2.5 SOLUTION RESPIRATORY (INHALATION)
Status: CANCELLED | OUTPATIENT
Start: 2021-12-10 | End: 2021-12-11

## 2021-12-10 RX ORDER — NALOXONE HYDROCHLORIDE 0.4 MG/ML
0.1 INJECTION, SOLUTION INTRAMUSCULAR; INTRAVENOUS; SUBCUTANEOUS AS NEEDED
Status: CANCELLED | OUTPATIENT
Start: 2021-12-10

## 2021-12-10 RX ORDER — SODIUM CHLORIDE 0.9 % (FLUSH) 0.9 %
5-40 SYRINGE (ML) INJECTION EVERY 8 HOURS
Status: CANCELLED | OUTPATIENT
Start: 2021-12-10

## 2021-12-10 RX ORDER — MORPHINE SULFATE 4 MG/ML
4 INJECTION INTRAVENOUS
Status: CANCELLED | OUTPATIENT
Start: 2021-12-10

## 2021-12-10 RX ORDER — CEFAZOLIN SODIUM/WATER 2 G/20 ML
2 SYRINGE (ML) INTRAVENOUS ONCE
Status: COMPLETED | OUTPATIENT
Start: 2021-12-10 | End: 2021-12-10

## 2021-12-10 RX ORDER — FENTANYL CITRATE 50 UG/ML
INJECTION, SOLUTION INTRAMUSCULAR; INTRAVENOUS AS NEEDED
Status: DISCONTINUED | OUTPATIENT
Start: 2021-12-10 | End: 2021-12-10 | Stop reason: HOSPADM

## 2021-12-10 RX ORDER — BUPIVACAINE HYDROCHLORIDE 5 MG/ML
INJECTION, SOLUTION EPIDURAL; INTRACAUDAL AS NEEDED
Status: DISCONTINUED | OUTPATIENT
Start: 2021-12-10 | End: 2021-12-10 | Stop reason: HOSPADM

## 2021-12-10 RX ORDER — ACETAMINOPHEN 500 MG
1000 TABLET ORAL
COMMUNITY

## 2021-12-10 RX ORDER — SODIUM CHLORIDE 0.9 % (FLUSH) 0.9 %
5-40 SYRINGE (ML) INJECTION AS NEEDED
Status: CANCELLED | OUTPATIENT
Start: 2021-12-10

## 2021-12-10 RX ORDER — MIDAZOLAM HYDROCHLORIDE 1 MG/ML
INJECTION, SOLUTION INTRAMUSCULAR; INTRAVENOUS AS NEEDED
Status: DISCONTINUED | OUTPATIENT
Start: 2021-12-10 | End: 2021-12-10 | Stop reason: HOSPADM

## 2021-12-10 RX ORDER — DIPHENHYDRAMINE HYDROCHLORIDE 50 MG/ML
12.5 INJECTION, SOLUTION INTRAMUSCULAR; INTRAVENOUS
Status: CANCELLED | OUTPATIENT
Start: 2021-12-10

## 2021-12-10 RX ORDER — DEXAMETHASONE SODIUM PHOSPHATE 4 MG/ML
INJECTION, SOLUTION INTRA-ARTICULAR; INTRALESIONAL; INTRAMUSCULAR; INTRAVENOUS; SOFT TISSUE AS NEEDED
Status: DISCONTINUED | OUTPATIENT
Start: 2021-12-10 | End: 2021-12-10 | Stop reason: HOSPADM

## 2021-12-10 RX ORDER — FENTANYL CITRATE 50 UG/ML
25 INJECTION, SOLUTION INTRAMUSCULAR; INTRAVENOUS AS NEEDED
Status: CANCELLED | OUTPATIENT
Start: 2021-12-10

## 2021-12-10 RX ORDER — PROPOFOL 10 MG/ML
INJECTION, EMULSION INTRAVENOUS
Status: DISCONTINUED | OUTPATIENT
Start: 2021-12-10 | End: 2021-12-10 | Stop reason: HOSPADM

## 2021-12-10 RX ORDER — ROPIVACAINE HYDROCHLORIDE 5 MG/ML
INJECTION, SOLUTION EPIDURAL; INFILTRATION; PERINEURAL
Status: COMPLETED | OUTPATIENT
Start: 2021-12-10 | End: 2021-12-10

## 2021-12-10 RX ORDER — SODIUM CHLORIDE, SODIUM LACTATE, POTASSIUM CHLORIDE, CALCIUM CHLORIDE 600; 310; 30; 20 MG/100ML; MG/100ML; MG/100ML; MG/100ML
100 INJECTION, SOLUTION INTRAVENOUS CONTINUOUS
Status: CANCELLED | OUTPATIENT
Start: 2021-12-10

## 2021-12-10 RX ADMIN — FENTANYL CITRATE 25 MCG: 50 INJECTION, SOLUTION INTRAMUSCULAR; INTRAVENOUS at 07:43

## 2021-12-10 RX ADMIN — MEPIVACAINE HYDROCHLORIDE 5 ML: 15 INJECTION, SOLUTION EPIDURAL; INFILTRATION at 07:17

## 2021-12-10 RX ADMIN — PROPOFOL 75 MCG/KG/MIN: 10 INJECTION, EMULSION INTRAVENOUS at 07:34

## 2021-12-10 RX ADMIN — MIDAZOLAM 2 MG: 1 INJECTION INTRAMUSCULAR; INTRAVENOUS at 07:29

## 2021-12-10 RX ADMIN — FENTANYL CITRATE 25 MCG: 50 INJECTION, SOLUTION INTRAMUSCULAR; INTRAVENOUS at 07:42

## 2021-12-10 RX ADMIN — ROPIVACAINE HYDROCHLORIDE 20 ML: 5 INJECTION, SOLUTION EPIDURAL; INFILTRATION; PERINEURAL at 07:17

## 2021-12-10 RX ADMIN — MIDAZOLAM 2 MG: 1 INJECTION INTRAMUSCULAR; INTRAVENOUS at 07:09

## 2021-12-10 RX ADMIN — FENTANYL CITRATE 100 MCG: 50 INJECTION, SOLUTION INTRAMUSCULAR; INTRAVENOUS at 07:09

## 2021-12-10 RX ADMIN — SODIUM CHLORIDE, POTASSIUM CHLORIDE, SODIUM LACTATE AND CALCIUM CHLORIDE 125 ML/HR: 600; 310; 30; 20 INJECTION, SOLUTION INTRAVENOUS at 06:25

## 2021-12-10 RX ADMIN — CEFAZOLIN 2 G: 1 INJECTION, POWDER, FOR SOLUTION INTRAVENOUS at 07:35

## 2021-12-10 NOTE — BRIEF OP NOTE
Brief Postoperative Note    Patient: Kaley Martinez  YOB: 1995  MRN: [id number]    Date of Procedure: 12/10/2021     Pre-Op Diagnosis: LEFT ANKLE EQUINUS LEFT PLANTAR FASCIA    Post-Op Diagnosis: same      Procedure(s):  LEFT ACHILLES TENDON LENGTHENING, PLANTAR FASCIA RELEASE LEFT FOOT    Surgeon(s):  Lucina Shipley MD    Surgical Assistant: Surg Asst-1: Anson Ospina    Anesthesia: MAC     Estimated Blood Loss (mL): none    Complications: none    Specimens: none    Implants: none    Drains: none    Findings: none  Electronically Signed by Latia Love MD on 12/10/2021 at 8:08 AM

## 2021-12-10 NOTE — ANESTHESIA PREPROCEDURE EVALUATION
Relevant Problems   RESPIRATORY SYSTEM   (+) Sleep apnea      ENDOCRINE   (+) Morbid obesity due to excess calories (HCC)       Anesthetic History   No history of anesthetic complications            Review of Systems / Medical History  Patient summary reviewed, nursing notes reviewed and pertinent labs reviewed    Pulmonary              Pertinent negatives: No sleep apnea and smoker     Neuro/Psych         Psychiatric history     Cardiovascular            Dysrhythmias : SVT      Exercise tolerance: >4 METS     GI/Hepatic/Renal     GERD: well controlled        Pertinent negatives: No hiatal hernia   Endo/Other      Hypothyroidism  Obesity and arthritis  Pertinent negatives: No morbid obesity   Other Findings              Physical Exam    Airway  Mallampati: I  TM Distance: > 6 cm  Neck ROM: normal range of motion   Mouth opening: Normal     Cardiovascular    Rhythm: regular  Rate: normal         Dental         Pulmonary  Breath sounds clear to auscultation               Abdominal  GI exam deferred       Other Findings            Anesthetic Plan    ASA: 3  Anesthesia type: general      Post-op pain plan if not by surgeon: peripheral nerve block single    Induction: Intravenous  Anesthetic plan and risks discussed with: Patient

## 2021-12-10 NOTE — PERIOP NOTES
Reviewed PTA medication list with patient/caregiver and patient/caregiver denies any additional medications. Patient admits to having a responsible adult care for them at home for at least 24 hours after surgery. Patient encouraged to use gown warming system and informed that using said warming gown to regulate body temperature prior to a procedure has been shown to help reduce the risks of blood clots and infection. Patient's pharmacy of choice verified and documented in PTA medication section. Dual skin assessment & fall risk band verification completed with Adrian Erwin RN.

## 2021-12-10 NOTE — DISCHARGE INSTRUCTIONS
DISCHARGE SUMMARY from Nurse    PATIENT INSTRUCTIONS:    After general anesthesia or intravenous sedation, for 24 hours or while taking prescription Narcotics:  · Limit your activities  · Do not drive and operate hazardous machinery  · Do not make important personal or business decisions  · Do  not drink alcoholic beverages  · If you have not urinated within 8 hours after discharge, please contact your surgeon on call. Report the following to your surgeon:  · Excessive pain, swelling, redness or odor of or around the surgical area  · Temperature over 100.5  · Nausea and vomiting lasting longer than 4 hours or if unable to take medications  · Any signs of decreased circulation or nerve impairment to extremity: change in color, persistent  numbness, tingling, coldness or increase pain  · Any questions    What to do at Home:  [address]    If you experience any of the following symptoms heavy bleeding, fevers, severe pain, circulation changes, please follow up with dr Prabhu Monroe    *  Please give a list of your current medications to your Primary Care Provider. *  Please update this list whenever your medications are discontinued, doses are      changed, or new medications (including over-the-counter products) are added. *  Please carry medication information at all times in case of emergency situations. These are general instructions for a healthy lifestyle:    No smoking/ No tobacco products/ Avoid exposure to second hand smoke  Surgeon General's Warning:  Quitting smoking now greatly reduces serious risk to your health.     Obesity, smoking, and sedentary lifestyle greatly increases your risk for illness    A healthy diet, regular physical exercise & weight monitoring are important for maintaining a healthy lifestyle    You may be retaining fluid if you have a history of heart failure or if you experience any of the following symptoms:  Weight gain of 3 pounds or more overnight or 5 pounds in a week, increased swelling in our hands or feet or shortness of breath while lying flat in bed. Please call your doctor as soon as you notice any of these symptoms; do not wait until your next office visit. The discharge information has been reviewed with the patient and caregiver. The patient and caregiver verbalized understanding. Discharge medications reviewed with the patient and caregiver and appropriate educational materials and side effects teaching were provided. ___________________________________________________________________________________________________________________________________Post op instructions     Non weight bearing with crutches  Keep leg elevated  Ice to ankle 30 minutes on 30 minutes off for 3 days     Sharon Kurtz, MDDischarge Instructions  JOSE [facility]  Foot  Procedures    You are being discharged after a procedure to your foot and ankle. Please continue to use your walker or crutches as long as you need them. Typically, you will be able to begin putting your heel on the floor for weight-bearing. However, lead with the foot that has been reconstructed and stop with your good leg before you put any weight on the toes of the operative foot. Dressing care-  You will need to keep your foot elevated for the next several days. Let your comfort help you decide how long your foot can be down over the next few days. During the first few days, your foot will feel tight and uncomfortable if it is down too long. It is normal for you to see a little blood staining on the dressing over your incision sites. It will be necessary to keep the dressing dry at all times. NEVER remove your dressing as it has been applied to hold your foot in the corrected position. Failure to follow this instruction may result in serious compromise of your correction. Medications- Please use your pain medication as directed.  Refills can only be obtained during normal office hours. It is always best in call before noon. Please use one aspirin a day (81 mg for 325 mg) for the first few months until you resume normal activity on your leg. Cautions- please report the following:  #1- unusual pain or pressure inside the dressing  #2- fever above 100.5  #3- any chest pain or shortness of breath  #4- any reactions to your medications that would necessitate a change in your medication  Call 911 for any serious life-threatening emergencies    Follow up appointments have already been arranged for you as follows:  Please call the office at [phone] if you need to change any of these.     Nusrat Mchugh MD 12/10/2021 8:11 AM    Patient armband removed and shredded

## 2021-12-10 NOTE — PROGRESS NOTES
Patient is scheduled for left achilles tendon lengthening and endoscopic plantar fasciotomy. All risks benefits discussed all options reviewed. She was given the opportunity to ask questions and all answered.  Consent is signed and on chart

## 2021-12-10 NOTE — ANESTHESIA POSTPROCEDURE EVALUATION
Procedure(s):  LEFT ACHILLES TENDON LENGTHENING, PLANTAR FASCIA RELEASE LEFT FOOT **SPEC POP**.    general    Anesthesia Post Evaluation      Multimodal analgesia: multimodal analgesia used between 6 hours prior to anesthesia start to PACU discharge  Patient location during evaluation: PACU  Patient participation: complete - patient participated  Level of consciousness: awake and alert  Pain score: 0  Airway patency: patent  Anesthetic complications: no  Cardiovascular status: acceptable  Respiratory status: acceptable  Hydration status: acceptable  Post anesthesia nausea and vomiting:  none  Final Post Anesthesia Temperature Assessment:  Normothermia (36.0-37.5 degrees C)      INITIAL Post-op Vital signs:   Vitals Value Taken Time   /59 12/10/21 0834   Temp 35.8 °C (96.5 °F) 12/10/21 0816   Pulse 103 12/10/21 0834   Resp 16 12/10/21 0834   SpO2 100 % 12/10/21 0834

## 2021-12-10 NOTE — ANESTHESIA PROCEDURE NOTES
L Anterior Sciatic Peripheral Block    Start time: 12/10/2021 7:10 AM  End time: 12/10/2021 7:17 AM  Performed by: Clovis Cooper MD  Authorized by: Clovis Cooper MD       Pre-procedure:    Indications: at surgeon's request and post-op pain management    Preanesthetic Checklist: patient identified, risks and benefits discussed, site marked, timeout performed, anesthesia consent given and patient being monitored    Timeout Time: 07:09 EST          Block Type:   Block Type:  Sciatic single shot  Laterality:  Left  Monitoring:  Standard ASA monitoring, responsive to questions, oxygen, continuous pulse ox, frequent vital sign checks and heart rate  Injection Technique:  Single shot  Procedures: ultrasound guided and nerve stimulator    Patient Position: supine (froglegged)  Prep: DuraPrep    Location:  Upper thigh  Needle Type:  Stimuplex  Needle Gauge:  20 G  Needle Localization:  Nerve stimulator and ultrasound guidance  Motor Response comment:   Motor Response: minimal motor response >0.4 mA   Medication Injected:  Ropivacaine (PF) (NAROPIN) 5 mg/mL (0.5 %) injection, 20 mL  mepivacaine PF (CARBOCAINE) 1.5 % injection, 5 mL  Med Admin Time: 12/10/2021 7:17 AM    Assessment:  Number of attempts:  1  Injection Assessment:  Incremental injection every 5 mL, local visualized surrounding nerve on ultrasound, negative aspiration for blood, no intravascular symptoms, no paresthesia and ultrasound image on chart  Patient tolerance:  Patient tolerated the procedure well with no immediate complications

## 2021-12-10 NOTE — OP NOTES
[facility] [place]  OPERATIVE REPORT    Name:  Darryl Ding  MR#:   [id number]  :  1995  ACCOUNT #:  [de-identified]  DATE OF SERVICE:  12/10/2021    PREOPERATIVE DIAGNOSES:  1. Ankle equinus, left. 2.  Plantar fasciitis, left. POSTOPERATIVE DIAGNOSES:  1. Ankle equinus, left. 2.  Plantar fasciitis, left. PROCEDURES PERFORMED:  1. Achilles tendon lengthening, left. 2.  Endoscopic plantar fasciotomy, left. SURGEON:  Albert Tena DPM    ASSISTANT:  none    ANESTHESIA:  Sedation with a sciatic block. HEMOSTASIS:  Calf tourniquet at 250 mmHg. COMPLICATIONS:  None. SPECIMENS REMOVED:  Pathology, none. IMPLANTS/MATERIALS:  3-0 Monocryl, 4-0 Monocryl, and 4-0 nylon. ESTIMATED BLOOD LOSS:  Minimal.    INJECTABLES:  0.5 mL dexamethasone. DISPOSITION:  The patient tolerated the procedure and anesthesia without complication and left the operating room with vascular status intact and vital signs stable. PROCEDURE:  The patient was brought to the operating room and placed on the table in the supine position where the block was achieved in the preop holding area. She was placed under sedation and a calf tourniquet was placed on a well-padded area on the left calf. The patient's foot and leg were prepped and draped in the usual sterile manner. The leg was elevated and exsanguinated, and the tourniquet was inflated. Attention was then directed to the posterior aspect of the left lower leg where a 2-cm incision was made in the midline. Incision was taken down through the subcutaneous tissue making sure to identify, retract, and preserve all neurovascular and tendon structures encountered. Hemostasis was achieved. At this time, dissection was taken down to the gastroc aponeurosis and the medial and lateral borders were identified. At this time, a Sgarlato type gastroc resection was performed and the foot was dorsiflexed and there was ample amount of motion noted.   The wound was flushed with copious amounts of a sterile saline solution. At this time, deep closure followed with 3-0 Monocryl, skin was closed with 4-0 Monocryl and Steri-Strips. Attention was then directed to the medial aspect of the right heel where the calcaneal drop-off was palpated. At this time, an incision was made and a Celine Rom was passed inferior to the fascia from medial to lateral.  At this time, the trocar and cannula were inserted. An incision was made on the lateral aspect for passage. The camera was inserted and the central, medial, and lateral bands were identified. Utilizing a triangle blade, the medial portion and the central band were incised. I utilized a hook blade to completely transect the medial band and medial portion of the central band. There was muscle belly noted. There was no other fascial fibers noted and the medial band had been completely transected. The wound was flushed with copious amounts of a sterile saline solution. At this time, the cannula was turned 180 degrees and there was fat lobules noted. No evidence of fascial fibers. This was then turned back and reinspected. At this time, the cannula and trocar were removed. Skin was reapproximated with 4-0 nylon. A dry sterile compressive bandage consisting of Xeroform, 4x4s, and a well-padded bandage was applied. Tourniquet was released. Hyperinflation was noted to return to all digits, 1 through 5. She was placed into a fiberglass posterior splint. She tolerated the procedure without complication and left the operating room with vascular status intact and vital signs stable. She enjoyed an uneventful time in the recovery room and was given both oral and written postoperative instructions. She will follow in the office for postoperative management. JOSÉ [id number]  D:  12/10/2021 8:17  T:  12/10/2021 9:15  JOB #:  [id number]  CC:   [facility]

## 2022-03-19 PROBLEM — E66.01 MORBID OBESITY DUE TO EXCESS CALORIES (HCC): Status: ACTIVE | Noted: 2018-04-03

## 2022-03-19 PROBLEM — M24.573 EQUINUS CONTRACTURE OF ANKLE: Status: ACTIVE | Noted: 2021-12-10

## 2022-03-19 PROBLEM — M72.2 PLANTAR FASCIITIS: Status: ACTIVE | Noted: 2021-12-10

## 2022-08-12 ENCOUNTER — TRANSCRIBE ORDER (OUTPATIENT)
Dept: REGISTRATION | Age: 27
End: 2022-08-12

## 2022-08-12 ENCOUNTER — HOSPITAL ENCOUNTER (OUTPATIENT)
Dept: LAB | Age: 27
Discharge: HOME | End: 2022-08-12

## 2022-08-12 ENCOUNTER — HOSPITAL ENCOUNTER (OUTPATIENT)
Dept: PREADMISSION TESTING | Age: 27
Discharge: HOME | End: 2022-08-12
Payer: MEDICAID

## 2022-08-12 DIAGNOSIS — M75.42 IMPINGEMENT SYNDROME OF LEFT SHOULDER: Primary | ICD-10-CM

## 2022-08-12 DIAGNOSIS — M75.42 IMPINGEMENT SYNDROME OF LEFT SHOULDER: ICD-10-CM

## 2022-08-12 LAB — XX-LABCORP SPECIMEN COL,LCBCF: NORMAL

## 2022-08-12 PROCEDURE — 99001 SPECIMEN HANDLING PT-LAB: CPT

## 2022-08-12 PROCEDURE — 93005 ELECTROCARDIOGRAM TRACING: CPT

## 2022-08-14 LAB
ATRIAL RATE: 65 BPM
CALCULATED P AXIS, ECG09: 30 DEGREES
CALCULATED R AXIS, ECG10: 68 DEGREES
CALCULATED T AXIS, ECG11: 47 DEGREES
DIAGNOSIS, 93000: NORMAL
P-R INTERVAL, ECG05: 156 MS
Q-T INTERVAL, ECG07: 396 MS
QRS DURATION, ECG06: 124 MS
QTC CALCULATION (BEZET), ECG08: 411 MS
VENTRICULAR RATE, ECG03: 65 BPM

## 2022-08-17 ENCOUNTER — HOSPITAL ENCOUNTER (OUTPATIENT)
Dept: PREADMISSION TESTING | Age: 27
Discharge: HOME | End: 2022-08-17

## 2022-08-17 VITALS — HEIGHT: 67 IN | WEIGHT: 210 LBS | BODY MASS INDEX: 32.96 KG/M2

## 2022-08-17 RX ORDER — SODIUM CHLORIDE, SODIUM LACTATE, POTASSIUM CHLORIDE, CALCIUM CHLORIDE 600; 310; 30; 20 MG/100ML; MG/100ML; MG/100ML; MG/100ML
125 INJECTION, SOLUTION INTRAVENOUS CONTINUOUS
Status: CANCELLED | OUTPATIENT
Start: 2022-08-24

## 2022-08-17 RX ORDER — CEFAZOLIN SODIUM/WATER 2 G/20 ML
2 SYRINGE (ML) INTRAVENOUS
Status: CANCELLED | OUTPATIENT
Start: 2022-08-24 | End: 2022-08-25

## 2022-08-17 NOTE — PERIOP NOTES
PAT - SURGICAL PRE-ADMISSION INSTRUCTIONS    NAME:  Ian Montoya                                                          TODAY'S DATE:  8/17/2022    SURGERY DATE:  8/24/2022                                  SURGERY ARRIVAL TIME:   TBD    Do NOT eat or drink anything, including candy or gum, after MIDNIGHT on 8/24/2022 , unless you have specific instructions from your Surgeon or Anesthesia Provider to do so. No smoking 24 hours before surgery. No alcohol 24 hours prior to the day of surgery. No recreational drugs for one week prior to the day of surgery. Leave all valuables, including money/purse, at home. Remove all jewelry, nail polish, makeup (including mascara); no lotions, powders, deodorant, or perfume/cologne/after shave. Glasses/Contact lenses and Dentures may be worn to the hospital.  They will be removed prior to surgery. Call your doctor if symptoms of a cold or illness develop within 24 ours prior to surgery. AN ADULT MUST DRIVE YOU HOME AFTER OUTPATIENT SURGERY. If you are having an OUTPATIENT procedure, please make arrangements for a responsible adult to be with you for 24 hours after your surgery. If you are admitted to the hospital, you will be assigned to a bed after surgery is complete. Normally a family member will not be able to see you until you are in your assigned bed. 12. Visitation Restrictions Explained. Pt is Spec Pop: hx. Fibromyalgia    Pt denies an advanced directive, pt denies DNR. Cardiology clearance under media. Special Instructions:  Covid Test not required at this time Pt vaccinated, vaccination record in media. Do not take sprionolactone on dos. May take ativan, levothyroxine, lamictal, desvenlafaxine on dos with small sip of water. Hold vitamins/supplements starting today. Patient Prep:  use CHG solution, pt received, instructions reviewed. These surgical instructions were reviewed with patient during the PAT phone interview.

## 2022-08-19 ENCOUNTER — HOSPITAL ENCOUNTER (EMERGENCY)
Age: 27
Discharge: HOME | End: 2022-08-19
Attending: EMERGENCY MEDICINE
Payer: MEDICAID

## 2022-08-19 ENCOUNTER — APPOINTMENT (OUTPATIENT)
Dept: GENERAL RADIOLOGY | Age: 27
End: 2022-08-19
Attending: PHYSICIAN ASSISTANT
Payer: MEDICAID

## 2022-08-19 VITALS
HEART RATE: 90 BPM | SYSTOLIC BLOOD PRESSURE: 125 MMHG | WEIGHT: 210 LBS | DIASTOLIC BLOOD PRESSURE: 73 MMHG | HEIGHT: 67 IN | TEMPERATURE: 97.5 F | BODY MASS INDEX: 32.96 KG/M2 | RESPIRATION RATE: 18 BRPM | OXYGEN SATURATION: 100 %

## 2022-08-19 DIAGNOSIS — G89.29 CHRONIC LEFT SHOULDER PAIN: Primary | ICD-10-CM

## 2022-08-19 DIAGNOSIS — M25.512 CHRONIC LEFT SHOULDER PAIN: Primary | ICD-10-CM

## 2022-08-19 PROCEDURE — 99283 EMERGENCY DEPT VISIT LOW MDM: CPT

## 2022-08-19 PROCEDURE — 74011250637 HC RX REV CODE- 250/637: Performed by: PHYSICIAN ASSISTANT

## 2022-08-19 PROCEDURE — 73030 X-RAY EXAM OF SHOULDER: CPT

## 2022-08-19 RX ORDER — CYCLOBENZAPRINE HCL 10 MG
10 TABLET ORAL
Status: COMPLETED | OUTPATIENT
Start: 2022-08-19 | End: 2022-08-19

## 2022-08-19 RX ORDER — CYCLOBENZAPRINE HCL 10 MG
10 TABLET ORAL
Qty: 15 TABLET | Refills: 0 | Status: SHIPPED | OUTPATIENT
Start: 2022-08-19

## 2022-08-19 RX ADMIN — CYCLOBENZAPRINE 10 MG: 10 TABLET, FILM COATED ORAL at 20:40

## 2022-08-19 NOTE — ED TRIAGE NOTES
Pt arrives to ed reporting left shoulder pain that began three days ago. Pt states her left shoulder has partial tear in it and she is scheduled for surgery next week however cannot take the pain anymore.

## 2022-08-20 NOTE — ED PROVIDER NOTES
EMERGENCY DEPARTMENT HISTORY AND PHYSICAL EXAM    Date: 8/19/2022  Patient Name: Kristy Ray    History of Presenting Illness     Chief Complaint   Patient presents with    Shoulder Pain         History Provided By: Patient    Chief Complaint: left shoulder pain       Additional History (Context):   8:42 PM  Kristy Ray is a 31 y.o. female with PMHX PCOS, WPW, chronic pain, chronic shoulder pain presents to the emergency department C/O left shoulder pain is been going on for quite some time she is scheduled to have surgery with her orthopedist soon but states her pain has been flaring up. States the pain radiates down the arm. No new injury. She denies any chest pain or shortness of breath. States this feels consistent with her chronic shoulder pain and was unable to get in with her orthopedist.      PCP: Jessica Kimbrough MD    Current Outpatient Medications   Medication Sig Dispense Refill    cyclobenzaprine (FLEXERIL) 10 mg tablet Take 1 Tablet by mouth three (3) times daily as needed for Muscle Spasm(s). 15 Tablet 0    acetaminophen (TYLENOL) 500 mg tablet Take 1,000 mg by mouth every six (6) hours as needed for Pain. Calcium-Cholecalciferol, D3, 500 mg(1,250mg) -400 unit tab Take  by mouth daily. cyanocobalamin 1,000 mcg tablet Take 1,000 mcg by mouth daily. (Patient not taking: Reported on 8/17/2022)      LORazepam (ATIVAN) 1 mg tablet Take  by mouth every four (4) hours as needed for Anxiety. Indications: anxious      levothyroxine (SYNTHROID) 100 mcg tablet Take  by mouth Daily (before breakfast). etonogestreL (Nexplanon) 68 mg impl by SubDERmal route. Desvenlafaxine 100 mg Tb24 Take  by mouth. Indications: repeated episodes of anxiety, bipolar      amitriptyline (ELAVIL) 25 mg tablet Take  by mouth nightly. Indications: posttraumatic stress syndrome      mv,calcium,min/iron/folic/vitK (ONE-A-DAY WOMEN'S COMPLETE PO) Take  by mouth daily.  (Patient not taking: Reported on 8/17/2022)      spironolactone (ALDACTONE) 50 mg tablet Take 50 mg by mouth daily. Indications: polycystic ovarian syndrome, a disease with cysts in the ovaries      lamoTRIgine (LaMICtal) 200 mg tablet Take 400 mg by mouth daily. loratadine (CLARITIN) 10 mg tablet Take 1 Tab by mouth daily.  (Patient taking differently: Take 10 mg by mouth as needed.) 30 Tab 3       Past History     Past Medical History:  Past Medical History:   Diagnosis Date    Arthritis     osteo hip    Bipolar 1 disorder, depressed ([facility] [address]     bipolar 2    Chronic pain     fibromyalgia    Depression     bipolar,ptsd, borderline personality disorder, disassociative identity disorder4    Drug overdose, intentional ([facility] [address] 10/2020    pt overdose with gabapentin, intubated, CPR    GERD (gastroesophageal reflux disease)     Headache(784.0)     Hypercholesterolemia     IBS (irritable bowel syndrome)     Keratosis pilaris     PCOS (polycystic ovarian syndrome)     taking spironolactone    Right bundle branch block     Sciatic nerve injury     Scoliosis     no surgery    Sleep apnea     pt states resolved with weight lost surgery    SVT (supraventricular tachycardia) ([facility] [address]     Tailbone injury     Thyroid disease     hypo    Tricuspid valve disorder 06/2021    congenital, had amplatzer device closure procedure    Ruiz-Parkinson-White syndrome     WPW & SVT, had ablation       Past Surgical History:  Past Surgical History:   Procedure Laterality Date    HX ANGIOPLASTY      HX GASTRIC BYPASS  04/03/2018    HX HEART CATHETERIZATION  2002.2004    Ablation of WPW    HX HEENT  2007    sinus    HX HEENT  2013    wisdom teeth    HX ORTHOPAEDIC Right 2014    wrist release    HX ORTHOPAEDIC Left 12/2021    achilles tendon repair    HX TONSILLECTOMY  2009/2010    x 2       Family History:  Family History   Problem Relation Age of Onset    Cancer Maternal Grandmother     Seizures Maternal Grandmother     Migraines Maternal Grandmother     OSTEOARTHRITIS Maternal Grandmother     Arthritis-rheumatoid Maternal Grandmother     Thyroid Disease Maternal Grandmother     Lung Disease Maternal Grandmother     High Cholesterol Mother     Hypertension Mother     Elevated Lipids Mother     Heart Disease Father     Psychiatric Disorder Father     Hypertension Father     Diabetes Father     Cancer Paternal Grandmother        Social History:  Social History     Tobacco Use    Smoking status: Never    Smokeless tobacco: Never    Tobacco comments:     no smoking within 24 hours of [employer] Use    Vaping Use: Never used   Substance Use Topics    Alcohol use: No    Drug use: Not Currently     Types: Marijuana     Comment: last use few months ago       Allergies: Allergies   Allergen Reactions    Abilify [Aripiprazole] Anaphylaxis    Dilaudid [Hydromorphone (Bulk)] Itching     Pt reports she became delusion after getting this med. Hydrocodone Itching     All the hydrocodones      Latuda [Lurasidone] Other (comments)     Unable to swallow    Morphine Itching    Other Medication Hives     Polyester     Roxicet [Oxycodone-Acetaminophen] Itching    Vicodin [Hydrocodone-Acetaminophen] Itching     Itching was severe and she became confused. Review of Systems   Review of Systems   Constitutional:  Negative for chills and fever. Respiratory:  Negative for shortness of breath. Cardiovascular:  Negative for chest pain, palpitations and leg swelling. Gastrointestinal:  Negative for abdominal pain, diarrhea, nausea and vomiting. Musculoskeletal:  Positive for arthralgias (left shoulder). Negative for neck pain and neck stiffness. Skin:  Negative for rash and wound. Neurological:  Negative for weakness, numbness and headaches. All other systems reviewed and are negative.     Physical Exam     Vitals:    08/19/22 1734   BP: 125/73   Pulse: 90   Resp: 18   Temp: 97.5 °F (36.4 °C)   SpO2: 100%   Weight: 95.3 kg (210 lb)   Height: 5' 7\" (1.702 m)     Physical Exam  Vitals and nursing note reviewed. Constitutional:       Appearance: She is well-developed. Comments: Sitting up on stretcher nontoxic no acute distress   HENT:      Head: Normocephalic and atraumatic. Neck:      Comments: C-spine nontender  Cardiovascular:      Rate and Rhythm: Normal rate and regular rhythm. Heart sounds: Normal heart sounds. No murmur heard. Pulmonary:      Effort: Pulmonary effort is normal. No respiratory distress. Breath sounds: Normal breath sounds. No wheezing or rales. Abdominal:      General: Bowel sounds are normal.      Palpations: Abdomen is soft. Tenderness: There is no abdominal tenderness. Musculoskeletal:      Cervical back: Normal range of motion and neck supple. Comments: Left shoulder TTP decreased range of motion secondary to pain, pain is completely recent reproducible with palpation, radial pulse 2+,  5 out of 5 bilaterally   Skin:     General: Skin is warm and dry. Findings: No rash. Neurological:      Mental Status: She is alert and oriented to person, place, and time. Psychiatric:         Judgment: Judgment normal.       Diagnostic Study Results     Labs:   No results found for this or any previous visit (from the past 12 hour(s)). Radiologic Studies:   XR SHOULDER LT AP/LAT MIN 2 V   Final Result      1. No significant abnormalities. CT Results  (Last 48 hours)      None          CXR Results  (Last 48 hours)      None            Medical Decision Making   I am the first provider for this patient. I reviewed the vital signs, available nursing notes, past medical history, past surgical history, family history and social history. Vital Signs: Reviewed the patient's vital signs. Pulse Oximetry Analysis: 100% on RA       Records Reviewed: Nursing Notes and Old Medical Records    Procedures:  Procedures    ED Course:   8:42 PM Initial assessment performed.  The patients presenting problems have been discussed, and they are in agreement with the care plan formulated and outlined with them. I have encouraged them to ask questions as they arise throughout their visit. Discussion:  Pt presents with left shoulder pain and is supposed to be scheduled for shoulder surgery next week. Has a history of gastric bypass and with upcoming surgery has been told not to take any NSAIDs or steroids. She denies any chest pain or shortness of breath. Pain reproducible with palpation of the shoulder and movement of the shoulder increases pain. She is neurovascularly intact. Suspect flare of chronic left shoulder pain. Doubt ACS or other cardiopulmonary etiology she feels like she would benefit from a muscle relaxer. We will give short prescription and have her follow-up with her orthopedist strict return precautions given, pt offering no questions or complaints. Diagnosis and Disposition     DISCHARGE NOTE:  Remigio Cuevas  results have been reviewed with her. She has been counseled regarding her diagnosis, treatment, and plan. She verbally conveys understanding and agreement of the signs, symptoms, diagnosis, treatment and prognosis and additionally agrees to follow up as discussed. She also agrees with the care-plan and conveys that all of her questions have been answered. I have also provided discharge instructions for her that include: educational information regarding their diagnosis and treatment, and list of reasons why they would want to return to the ED prior to their follow-up appointment, should her condition change. She has been provided with education for proper emergency department utilization. CLINICAL IMPRESSION:    1. Chronic left shoulder pain        PLAN:  1. D/C Home  2. Current Discharge Medication List        START taking these medications    Details   cyclobenzaprine (FLEXERIL) 10 mg tablet Take 1 Tablet by mouth three (3) times daily as needed for Muscle Spasm(s).   Qty: 15 Tablet, Refills: 0  Start date: 8/19/2022           3. Follow-up Information       Follow up With Specialties Details Why Katina Saravia MD [facility] Medicine Schedule an appointment as soon as possible for a visit   [address] [place] [address],[address] Tremaine Adamson [phone]      [facility] EMERGENCY DEPT Emergency Medicine  If symptoms worsen [address] [place] [address]  [phone]                   Please note that this dictation was completed with [employer], the computer voice recognition software. Quite often unanticipated grammatical, syntax, homophones, and other interpretive errors are inadvertently transcribed by the computer software. Please disregard these errors. Please excuse any errors that have escaped final proofreading.

## 2022-08-24 ENCOUNTER — ANESTHESIA (OUTPATIENT)
Dept: SURGERY | Age: 27
End: 2022-08-24
Payer: MEDICAID

## 2022-08-24 ENCOUNTER — HOSPITAL ENCOUNTER (OUTPATIENT)
Age: 27
Setting detail: OUTPATIENT SURGERY
Discharge: HOME | End: 2022-08-24
Attending: ORTHOPAEDIC SURGERY | Admitting: ORTHOPAEDIC SURGERY
Payer: MEDICAID

## 2022-08-24 ENCOUNTER — ANESTHESIA EVENT (OUTPATIENT)
Dept: SURGERY | Age: 27
End: 2022-08-24
Payer: MEDICAID

## 2022-08-24 VITALS
SYSTOLIC BLOOD PRESSURE: 109 MMHG | WEIGHT: 212.3 LBS | RESPIRATION RATE: 16 BRPM | HEART RATE: 66 BPM | OXYGEN SATURATION: 97 % | HEIGHT: 67 IN | DIASTOLIC BLOOD PRESSURE: 59 MMHG | BODY MASS INDEX: 33.32 KG/M2 | TEMPERATURE: 97 F

## 2022-08-24 DIAGNOSIS — M75.42 IMPINGEMENT SYNDROME OF LEFT SHOULDER: Primary | ICD-10-CM

## 2022-08-24 LAB — HCG UR QL: NEGATIVE

## 2022-08-24 PROCEDURE — 77030034478 HC TU IRR ARTHRO PT ARTH -B: Performed by: ORTHOPAEDIC SURGERY

## 2022-08-24 PROCEDURE — 77030040361 HC SLV COMPR DVT MDII -B: Performed by: ORTHOPAEDIC SURGERY

## 2022-08-24 PROCEDURE — 77030018835 HC SOL IRR LR ICUM -A: Performed by: ORTHOPAEDIC SURGERY

## 2022-08-24 PROCEDURE — 77030018725 HC ELECTRD 90DEG DISP J&J -D: Performed by: ORTHOPAEDIC SURGERY

## 2022-08-24 PROCEDURE — 76210000021 HC REC RM PH II 0.5 TO 1 HR: Performed by: ORTHOPAEDIC SURGERY

## 2022-08-24 PROCEDURE — 74011250636 HC RX REV CODE- 250/636: Performed by: NURSE ANESTHETIST, CERTIFIED REGISTERED

## 2022-08-24 PROCEDURE — 64450 NJX AA&/STRD OTHER PN/BRANCH: CPT | Performed by: ORTHOPAEDIC SURGERY

## 2022-08-24 PROCEDURE — 74011250636 HC RX REV CODE- 250/636: Performed by: STUDENT IN AN ORGANIZED HEALTH CARE EDUCATION/TRAINING PROGRAM

## 2022-08-24 PROCEDURE — 77030006877 HC BLD SHV FLL RAD S&N -B: Performed by: ORTHOPAEDIC SURGERY

## 2022-08-24 PROCEDURE — 74011000250 HC RX REV CODE- 250: Performed by: STUDENT IN AN ORGANIZED HEALTH CARE EDUCATION/TRAINING PROGRAM

## 2022-08-24 PROCEDURE — 74011250636 HC RX REV CODE- 250/636: Performed by: ORTHOPAEDIC SURGERY

## 2022-08-24 PROCEDURE — 81025 URINE PREGNANCY TEST: CPT

## 2022-08-24 PROCEDURE — 76060000032 HC ANESTHESIA 0.5 TO 1 HR: Performed by: ORTHOPAEDIC SURGERY

## 2022-08-24 PROCEDURE — 76942 ECHO GUIDE FOR BIOPSY: CPT | Performed by: ORTHOPAEDIC SURGERY

## 2022-08-24 PROCEDURE — 2709999900 HC NON-CHARGEABLE SUPPLY: Performed by: ORTHOPAEDIC SURGERY

## 2022-08-24 PROCEDURE — 77030002916 HC SUT ETHLN J&J -A: Performed by: ORTHOPAEDIC SURGERY

## 2022-08-24 PROCEDURE — 77030020782 HC GWN BAIR PAWS FLX 3M -B: Performed by: ORTHOPAEDIC SURGERY

## 2022-08-24 PROCEDURE — 74011000250 HC RX REV CODE- 250: Performed by: NURSE ANESTHETIST, CERTIFIED REGISTERED

## 2022-08-24 PROCEDURE — 76010000138 HC OR TIME 0.5 TO 1 HR: Performed by: ORTHOPAEDIC SURGERY

## 2022-08-24 RX ORDER — SODIUM CHLORIDE, SODIUM LACTATE, POTASSIUM CHLORIDE, CALCIUM CHLORIDE 600; 310; 30; 20 MG/100ML; MG/100ML; MG/100ML; MG/100ML
125 INJECTION, SOLUTION INTRAVENOUS CONTINUOUS
Status: DISCONTINUED | OUTPATIENT
Start: 2022-08-24 | End: 2022-08-24 | Stop reason: HOSPADM

## 2022-08-24 RX ORDER — MIDAZOLAM HYDROCHLORIDE 1 MG/ML
INJECTION, SOLUTION INTRAMUSCULAR; INTRAVENOUS
Status: COMPLETED | OUTPATIENT
Start: 2022-08-24 | End: 2022-08-24

## 2022-08-24 RX ORDER — FENTANYL CITRATE 50 UG/ML
INJECTION, SOLUTION INTRAMUSCULAR; INTRAVENOUS
Status: COMPLETED
Start: 2022-08-24 | End: 2022-08-24

## 2022-08-24 RX ORDER — DIPHENHYDRAMINE HYDROCHLORIDE 50 MG/ML
12.5 INJECTION, SOLUTION INTRAMUSCULAR; INTRAVENOUS
Status: CANCELLED | OUTPATIENT
Start: 2022-08-24

## 2022-08-24 RX ORDER — ALBUTEROL SULFATE 0.83 MG/ML
2.5 SOLUTION RESPIRATORY (INHALATION)
Status: CANCELLED | OUTPATIENT
Start: 2022-08-24 | End: 2022-08-25

## 2022-08-24 RX ORDER — SODIUM CHLORIDE 0.9 % (FLUSH) 0.9 %
5-40 SYRINGE (ML) INJECTION AS NEEDED
Status: CANCELLED | OUTPATIENT
Start: 2022-08-24

## 2022-08-24 RX ORDER — NALBUPHINE HYDROCHLORIDE 10 MG/ML
5 INJECTION, SOLUTION INTRAMUSCULAR; INTRAVENOUS; SUBCUTANEOUS
Status: CANCELLED | OUTPATIENT
Start: 2022-08-24

## 2022-08-24 RX ORDER — CEFAZOLIN SODIUM/WATER 2 G/20 ML
2 SYRINGE (ML) INTRAVENOUS
Status: COMPLETED | OUTPATIENT
Start: 2022-08-24 | End: 2022-08-24

## 2022-08-24 RX ORDER — NALOXONE HYDROCHLORIDE 0.4 MG/ML
0.04 INJECTION, SOLUTION INTRAMUSCULAR; INTRAVENOUS; SUBCUTANEOUS
Status: CANCELLED | OUTPATIENT
Start: 2022-08-24

## 2022-08-24 RX ORDER — HYDROMORPHONE HYDROCHLORIDE 1 MG/ML
0.5 INJECTION, SOLUTION INTRAMUSCULAR; INTRAVENOUS; SUBCUTANEOUS AS NEEDED
Status: CANCELLED | OUTPATIENT
Start: 2022-08-24

## 2022-08-24 RX ORDER — DEXAMETHASONE SODIUM PHOSPHATE 10 MG/ML
INJECTION INTRAMUSCULAR; INTRAVENOUS
Status: COMPLETED | OUTPATIENT
Start: 2022-08-24 | End: 2022-08-24

## 2022-08-24 RX ORDER — SODIUM CHLORIDE 0.9 % (FLUSH) 0.9 %
5-40 SYRINGE (ML) INJECTION EVERY 8 HOURS
Status: CANCELLED | OUTPATIENT
Start: 2022-08-24

## 2022-08-24 RX ORDER — SODIUM CHLORIDE, SODIUM LACTATE, POTASSIUM CHLORIDE, CALCIUM CHLORIDE 600; 310; 30; 20 MG/100ML; MG/100ML; MG/100ML; MG/100ML
50 INJECTION, SOLUTION INTRAVENOUS CONTINUOUS
Status: CANCELLED | OUTPATIENT
Start: 2022-08-24

## 2022-08-24 RX ORDER — PROPOFOL 10 MG/ML
INJECTION, EMULSION INTRAVENOUS AS NEEDED
Status: DISCONTINUED | OUTPATIENT
Start: 2022-08-24 | End: 2022-08-24 | Stop reason: HOSPADM

## 2022-08-24 RX ORDER — KETOROLAC TROMETHAMINE 10 MG/1
10 TABLET, FILM COATED ORAL
Qty: 10 TABLET | Refills: 0 | Status: SHIPPED | OUTPATIENT
Start: 2022-08-24

## 2022-08-24 RX ORDER — FENTANYL CITRATE 50 UG/ML
50 INJECTION, SOLUTION INTRAMUSCULAR; INTRAVENOUS
Status: CANCELLED | OUTPATIENT
Start: 2022-08-24

## 2022-08-24 RX ORDER — MIDAZOLAM HYDROCHLORIDE 1 MG/ML
INJECTION, SOLUTION INTRAMUSCULAR; INTRAVENOUS
Status: COMPLETED
Start: 2022-08-24 | End: 2022-08-24

## 2022-08-24 RX ORDER — ROPIVACAINE HYDROCHLORIDE 5 MG/ML
INJECTION, SOLUTION EPIDURAL; INFILTRATION; PERINEURAL
Status: COMPLETED | OUTPATIENT
Start: 2022-08-24 | End: 2022-08-24

## 2022-08-24 RX ORDER — ONDANSETRON 2 MG/ML
4 INJECTION INTRAMUSCULAR; INTRAVENOUS ONCE
Status: CANCELLED | OUTPATIENT
Start: 2022-08-24 | End: 2022-08-24

## 2022-08-24 RX ORDER — LIDOCAINE HYDROCHLORIDE 20 MG/ML
INJECTION, SOLUTION EPIDURAL; INFILTRATION; INTRACAUDAL; PERINEURAL AS NEEDED
Status: DISCONTINUED | OUTPATIENT
Start: 2022-08-24 | End: 2022-08-24 | Stop reason: HOSPADM

## 2022-08-24 RX ORDER — DEXMEDETOMIDINE HYDROCHLORIDE 100 UG/ML
INJECTION, SOLUTION INTRAVENOUS
Status: COMPLETED | OUTPATIENT
Start: 2022-08-24 | End: 2022-08-24

## 2022-08-24 RX ORDER — FENTANYL CITRATE 50 UG/ML
INJECTION, SOLUTION INTRAMUSCULAR; INTRAVENOUS
Status: COMPLETED | OUTPATIENT
Start: 2022-08-24 | End: 2022-08-24

## 2022-08-24 RX ADMIN — PROPOFOL 75 MCG/KG/MIN: 10 INJECTION, EMULSION INTRAVENOUS at 10:34

## 2022-08-24 RX ADMIN — DEXAMETHASONE SODIUM PHOSPHATE 4 MG: 10 INJECTION, SOLUTION INTRAMUSCULAR; INTRAVENOUS at 10:02

## 2022-08-24 RX ADMIN — DEXMEDETOMIDINE HYDROCHLORIDE 25 MCG: 100 INJECTION, SOLUTION INTRAVENOUS at 10:02

## 2022-08-24 RX ADMIN — MIDAZOLAM 2 MG: 1 INJECTION INTRAMUSCULAR; INTRAVENOUS at 10:20

## 2022-08-24 RX ADMIN — Medication 2 G: at 10:37

## 2022-08-24 RX ADMIN — LIDOCAINE HYDROCHLORIDE 60 MG: 20 INJECTION, SOLUTION INTRAVENOUS at 10:30

## 2022-08-24 RX ADMIN — PROPOFOL 50 MG: 10 INJECTION, EMULSION INTRAVENOUS at 10:30

## 2022-08-24 RX ADMIN — ROPIVACAINE HYDROCHLORIDE 20 ML: 5 INJECTION, SOLUTION EPIDURAL; INFILTRATION; PERINEURAL at 10:02

## 2022-08-24 RX ADMIN — MEPIVACAINE HYDROCHLORIDE 10 ML: 20 INJECTION, SOLUTION EPIDURAL; INFILTRATION at 10:02

## 2022-08-24 RX ADMIN — SODIUM CHLORIDE, SODIUM LACTATE, POTASSIUM CHLORIDE, AND CALCIUM CHLORIDE 125 ML/HR: 600; 310; 30; 20 INJECTION, SOLUTION INTRAVENOUS at 09:53

## 2022-08-24 RX ADMIN — MIDAZOLAM 2 MG: 1 INJECTION INTRAMUSCULAR; INTRAVENOUS at 09:58

## 2022-08-24 RX ADMIN — FENTANYL CITRATE 25 MCG: 50 INJECTION, SOLUTION INTRAMUSCULAR; INTRAVENOUS at 09:58

## 2022-08-24 NOTE — PERIOP NOTES
TRANSFER - IN REPORT:    Verbal report received from Nate RN(name) on [employer]  being received from OR(unit) for routine post - op      Report consisted of patients Situation, Background, Assessment and   Recommendations(SBAR). Information from the following report(s) SBAR, Procedure Summary, and MAR was reviewed with the receiving nurse. Opportunity for questions and clarification was provided. Assessment completed upon patients arrival to unit and care assumed.

## 2022-08-24 NOTE — DISCHARGE INSTRUCTIONS
Upper Extremity Surgery Discharge Instructions  Dr. Patience Pisano    Please take the time to review the following instructions before you leave the hospital and use them as guidelines during your recovery from surgery. If you have any questions, you may contact my office at [phone]. Wound Care / Dressing Change    __________ Do NOT remove your dressing or get them wet.     ____x______ You may change your dressings as needed. Beginning 2 days after you are discharged from the hospital, you should change your dressings daily. A big, bulky dressing isnt necessary as long as there is no drainage from the incisions. You can put a band-aid or piece of gauze over each incision. It isnt necessary to apply antibiotic ointment to your incisions. If you have steri-strips over you incision, they will start to peel off in 7-10 days as you get them wet. They dont need to be removed before that. When they begin to peel off, you may remove them. Showering / Bathing    _____x_____ Bing Goldberg may shower 2 days after your surgery. Your dressing may be removed for showering. You may get your incisions wet in the shower. Do not vigorously scrub your incisions. Apply a clean, dry dressing after you have dried your incisions. Do not take a bath or get into a swimming pool or Jacuzzi until further instruction. Do not soak your incisions under water.     __________ Do not get the clear, plastic dressing wet. Once you remove it four days from surgery, you may get the incision wet if there is no drainage. If there is drainage, please call the office. Sling    ___x______ Bing Goldberg are not required to wear a sling and should do so only as needed for comfort.     _________ Keep your arm in the immobilizer/sling at all times except when showering and changing your clothes. When showering or changing, keep your arm at your side. Do not move it away from your body.      _________ Keep your arm in the immobilizer/sling at all times except when showering and changing your clothes, and doing the exercises shown to you by Dr. Kofi Brown or your physical/occupational therapist prior to your discharge from the hospital. Keep your arm at your side when changing your clothes and showering. Do not move it away from your body. Ice and Elevation    Continue ice consistently for 48 hours after surgery. After 48 hours, you should ice 3 times per day for 20 minutes at a time for the next 5 days. After 1 week from surgery, you may use ice as needed for pain. Diet    You may advance your regular diet as tolerated. Increase your clear liquid intake for the next 2-3 days. Medications    You will be given prescriptions for pain medication, inflammation, and nausea when you are discharged from the hospital. Please use the medications as prescribed. Pain medications may cause constipation - Colace or Milk of Magnesia may be used as needed. Other possible side effects of pain medications are dizziness, headache, nausea, vomiting, and urinary retention. Discontinue the pain medication if you develop itching, rash, shortness of breath, or difficulties swallowing. If these symptoms become severe or arent relieved by discontinuing the medication, you should seek immediate medical attention. Refills of pain medication are authorized during office hours only (8AM - 5PM Monday through Friday)  If you were prescribed Percocet/Oxycodone or Dilaudid/Hydromorphone you must have a written prescription. These medications cannot be leagally called into the pharmacy. Do not take Tylenol/Acetaminophen in addition to your pain medication, as most pain medications already contain this. Do not exceed 4000mg of Tylenol/Acetaminophen per day. You may resume the medication you were taking prior to your surgery. Pain medication may change the effects of any antidepressant medication you may be taking.  If you have any questions about possible interactions between your regular medication and the pain medication, you should consult the physician who prescribes your regular medications. Other instructions:  Interscalene Block normal course: A numb and often immobile shoulder and upper arm is expected for approximately 8 - 12 hours after the surgery. The duration of the numbness can vary and is dependent on the type of local anesthetic used, additives and individual variation. Once the numbness starts to wear off, the discomfort from surgery will intensify progressively over the next 1-2 hours. Therefore we recommend starting oral narcotics (e.g. Norco or Percocet) as soon as oral medications are tolerated. These medications should be taken on a scheduled basis, allowing for a smooth transition from the nerve block to oral medication based pain relief. Follow up appointment  Please follow up at your scheduled appointment 7-10 days from the day of your surgery. If you do not already have an appointment, please call our office at [phone] for your follow up appointment. Physical Therapy:    ______ If you already have a therapy appointment, please be sure to attend your sessions as scheduled. If you do not have physical therapy scheduled, please call Dr. Chencho Johnson office to set up your first appointment as soon as possible. __x____ Physical Therapy will be discussed with you at your first follow-up appointment with Dr. Julissa Hutchinson. You do not need to start therapy prior to that.     ______ Begin physical therapy with your Home Health Physical Therapy. This will be set up for you before you leave the hospital.     Important Signs and Symptoms:    If any of the following signs and symptoms occurs, you should contact Dr. Chencho Johnson office at [phone],. Please be advised if a problem arises which you feel requires immediate medical attention or you are unable to contact Dr. Chencho Johnson office,  you should seek immediate medical attention.        Signs and symptoms to watch for include:    A sudden increase in swelling and/or redness or warmth at the area of your surgery which isnt relieved by rest, ice, and elevation. Oral temperature greater than 101.5 degrees for 12 hours or more which isnt relieved by an increase in fluid intake and taking two Tylenol every 4-6 hours. Excessive drainage from your incisions, or drainage which hasnt stopped by 72 hours after your surgery. Fever, chills, shortness of breath, chest pain, nausea, vomiting or other signs and symptoms which are of concern to you. DISCHARGE SUMMARY from Nurse    PATIENT INSTRUCTIONS:    After general anesthesia or intravenous sedation, for 24 hours or while taking prescription Narcotics:  Limit your activities  Do not drive and operate hazardous machinery  Do not make important personal or business decisions  Do  not drink alcoholic beverages  If you have not urinated within 8 hours after discharge, please contact your surgeon on call. Report the following to your surgeon:  Excessive pain, swelling, redness or odor of or around the surgical area  Temperature over 100.5  Nausea and vomiting lasting longer than 4 hours or if unable to take medications  Any signs of decreased circulation or nerve impairment to extremity: change in color, persistent  numbness, tingling, coldness or increase pain  Any questions    What to do at Home:  Recommended activity: Ambulate in house,     If you experience any of the following symptoms above, please follow up with Dr. Boaz Valencia. *  Please give a list of your current medications to your Primary Care Provider. *  Please update this list whenever your medications are discontinued, doses are      changed, or new medications (including over-the-counter products) are added. *  Please carry medication information at all times in case of emergency situations.     These are general instructions for a healthy lifestyle:    No smoking/ No tobacco products/ Avoid exposure to second hand smoke  Surgeon General's Warning:  Quitting smoking now greatly reduces serious risk to your health. Obesity, smoking, and sedentary lifestyle greatly increases your risk for illness    A healthy diet, regular physical exercise & weight monitoring are important for maintaining a healthy lifestyle    You may be retaining fluid if you have a history of heart failure or if you experience any of the following symptoms:  Weight gain of 3 pounds or more overnight or 5 pounds in a week, increased swelling in our hands or feet or shortness of breath while lying flat in bed. Please call your doctor as soon as you notice any of these symptoms; do not wait until your next office visit. Patient armband removed and shredded     The discharge information has been reviewed with the patient and caregiver. The patient and caregiver verbalized understanding. Discharge medications reviewed with the patient and caregiver and appropriate educational materials and side effects teaching were provided.   ___________________________________________________________________________________________________________________________________

## 2022-08-24 NOTE — ANESTHESIA PROCEDURE NOTES
Peripheral Block    Start time: 8/24/2022 9:58 AM  End time: 8/24/2022 10:02 AM  Performed by: Frank Mota MD  Authorized by: Frank Mota MD       Pre-procedure: Indications: at surgeon's request and post-op pain management    Preanesthetic Checklist: patient identified, risks and benefits discussed, site marked, timeout performed, anesthesia consent given and patient being monitored    Timeout Time: 09:58 EDT      Block Type:   Block Type:   Interscalene  Laterality:  Left  Monitoring:  Standard ASA monitoring, responsive to questions, oxygen, continuous pulse ox, frequent vital sign checks and heart rate  Injection Technique:  Single shot  Procedures: ultrasound guided and nerve stimulator    Patient Position: seated  Prep: chlorhexidine    Location:  Interscalene  Needle Type:  Stimuplex  Needle Gauge:  22 G  Needle Localization:  Nerve stimulator and ultrasound guidance  Motor Response comment:   Motor Response: minimal motor response >0.4 mA    Medication Injected:  FentaNYL citrate (PF) injection sedation initial - IntraVENous   25 mcg - 8/24/2022 9:58:00 AM  midazolam (VERSED) injection - IntraVENous   2 mg - 8/24/2022 9:58:00 AM  mepivacaine (PF) 2 % (POLOCAINE) injection - Peripheral Nerve Block   10 mL - 8/24/2022 10:02:00 AM  ropivacaine (PF) (NAROPIN) 5 mg/mL (0.5 %) injection - Peripheral Nerve Block   20 mL - 8/24/2022 10:02:00 AM  dexamethasone (PF) (DECADRON) 10 mg/mL injection - Peripheral Nerve Block   4 mg - 8/24/2022 10:02:00 AM  dexmedeTOMidine (PRECEDEX) 100 mcg/mL iv solution - Peripheral Nerve Block   25 mcg - 8/24/2022 10:02:00 AM  Med Admin Time: 8/24/2022 10:02 AM    Assessment:  Number of attempts:  1  Injection Assessment:  Incremental injection every 5 mL, negative aspiration for CSF, no paresthesia, ultrasound image on chart, local visualized surrounding nerve on ultrasound, negative aspiration for blood and no intravascular symptoms  Patient tolerance:  Patient tolerated the procedure well with no immediate complications  Ultrasound guidance was used to view and verify medication disbursement and was also used for needle placement.

## 2022-08-24 NOTE — OP NOTES
SHOULDER ARTHROSCOPY OP NOTE      OPERATIVE REPORT    Patient: Ronald Alicea CSN: [id number] SSN: [id number]    YOB: 1995  Age: 31 y.o. Sex: female      Admit Date: 8/24/2022  Admit Diagnosis: LEFT SHOULDER IMPINGEMENT SYNDROME    Date of Procedure: 8/24/2022   Preoperative Diagnosis: LEFT SHOULDER IMPINGEMENT SYNDROME  Postoperative Diagnosis: LEFT SHOULDER IMPINGEMENT SYNDROME    Procedure: Procedure(s):  LEFT SHOULDER ARTHROSCOPY WITH SUBACROMIAL DECOMPRESSION \"SPEC POP\" and rotator cuff debridement  Surgeon: Migue Priest MD  Assistant(s):  none  Anesthesia: General   Estimated Blood Loss:<50CC  Specimens: * No specimens in log *   Complications: None  Implants: * No implants in log *      BRIEF HISTORY: The patient is a 31 y.o. female who has been suffering from right shoulder pain. She has failed conservative treatment including activity modifications, injections, and other modalities. We did discuss the option of surgical management with him. She understood the risks and benefits and elected to proceed. DESCRIPTION OF PROCEDURE:  The patient was brought to the operating suite, properly identified, placed supine upon the operating table and placed under a general anesthetic. She had a block placed in preoperative holding. once an adequate level of anesthesia was obtained, he was placed into a beach chair position. She was appropriately padded and positioned. She was then prepped and draped in the usual sterile fashion. The joint was injected with saline and a posterior portal was created in standard fashion. The arthroscope was advanced into the joint and a complete survey was performed. The glenohumeral surfaces showed no abnormality. The labrum was intact circumferentially. The under side of the cuff was intact. The were no loose bodies seen. The biceps tendon was intact with a stable attachment to the labrum and a stable egress from the joint.      The arthroscope was then advanced into the subacromial space. The cuff was evaluated. No significant tears were noted. There was fraying. The underside of the acromion and the clavicle had some spurs. A lateral portal was made in standard fashion. The electrocautery was used to remove hyperemic bursal tissue. The shaver was used to debride the cuff and remove the large spurs so that no further impingement would occur. The frayed cuff was debrided. Once that was completed, the area was lavaged. The instruments were removed. We did have 3 portal sites at the end, they were all closed with 3-0 nylon. A sterile compressive dressing was  Applied. The patient was placed in a sling, awakened, and transferred to the recovery room in stable condition. All needle and sponge counts were reported as correct at the end of the case.

## 2022-08-24 NOTE — H&P
Orthopaedic PRE-OP Admission History and Physical    Patient: Tristian Mireles MRN: [id number]  SSN: [id number]    YOB: 1995  Age: 31 y.o. Sex: female            Subjective:   Patient is a 31 y.o.  female who presents with history of right shoulder pain. She has failed conservative treatment including PT, injections, nsaids and other modalities. She had an MRI that showed impingement.         Patient Active Problem List    Diagnosis Date Noted    Equinus contracture of ankle 12/10/2021    Plantar fasciitis 12/10/2021    Morbid obesity due to excess calories ([address] 04/03/2018    Vitamin D deficiency 04/28/2014    Osteoarthrosis 04/25/2014    Sleep apnea 04/25/2014    PCOS (polycystic ovarian syndrome) 04/25/2013     Past Medical History:   Diagnosis Date    Arthritis     osteo hip    Bipolar 1 disorder, depressed ([address]     bipolar 2    Chronic pain     fibromyalgia    Depression     bipolar,ptsd, borderline personality disorder, disassociative identity disorder4    Drug overdose, intentional ([address] 10/2020    pt overdose with gabapentin, intubated, CPR    GERD (gastroesophageal reflux disease)     Headache(784.0)     Hypercholesterolemia     IBS (irritable bowel syndrome)     Keratosis pilaris     PCOS (polycystic ovarian syndrome)     taking spironolactone    Right bundle branch block     Sciatic nerve injury     Scoliosis     no surgery    Sleep apnea     pt states resolved with weight lost surgery    SVT (supraventricular tachycardia) ([address]     Tailbone injury     Thyroid disease     hypo    Tricuspid valve disorder 06/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 Authorizing Provider   cyclobenzaprine (FLEXERIL) 10 mg tablet Take 1 Tablet by mouth three (3) times daily as needed for Muscle Spasm(s). 8/19/22  Yes Shana Altamirano PA   acetaminophen (TYLENOL) 500 mg tablet Take 1,000 mg by mouth every six (6) hours as needed for Pain. Yes Provider, Historical   LORazepam (ATIVAN) 1 mg tablet Take  by mouth every four (4) hours as needed for Anxiety. Indications: anxious   Yes Provider, Historical   levothyroxine (SYNTHROID) 100 mcg tablet Take  by mouth Daily (before breakfast). Yes Provider, Historical   etonogestreL (Nexplanon) 68 mg impl by SubDERmal route. Yes Provider, Historical   Desvenlafaxine 100 mg Tb24 Take  by mouth. Indications: repeated episodes of anxiety, bipolar   Yes Provider, Historical   amitriptyline (ELAVIL) 25 mg tablet Take  by mouth nightly. Indications: posttraumatic stress syndrome   Yes Provider, Historical   spironolactone (ALDACTONE) 50 mg tablet Take 50 mg by mouth daily. Indications: polycystic ovarian syndrome, a disease with cysts in the ovaries   Yes Provider, Historical   lamoTRIgine (LaMICtal) 200 mg tablet Take 400 mg by mouth daily. Yes Other, MD Jadyn   loratadine (CLARITIN) 10 mg tablet Take 1 Tab by mouth daily. Patient taking differently: Take 10 mg by mouth as needed. 2/19/14  Yes Minal Kramer MD   Calcium-Cholecalciferol, D3, 500 mg(1,250mg) -400 unit tab Take  by mouth daily. Provider, Historical   cyanocobalamin 1,000 mcg tablet Take 1,000 mcg by mouth daily. Provider, Historical   mv,calcium,min/iron/folic/vitK (ONE-A-DAY WOMEN'S COMPLETE PO) Take  by mouth daily.   Patient not taking: No sig reported    Provider, Historical     Current Facility-Administered Medications   Medication Dose Route Frequency    ceFAZolin (ANCEF) 2 g/20 mL in sterile water IV syringe  2 g IntraVENous ON CALL TO OR    lactated Ringers infusion  125 mL/hr IntraVENous CONTINUOUS      Allergies   Allergen Reactions    Abilify [Aripiprazole] Anaphylaxis    Dilaudid [Hydromorphone (Bulk)] Itching     Pt reports she became delusion after getting this med. Hydrocodone Itching     All the hydrocodones      Latuda [Lurasidone] Other (comments)     Unable to swallow    Morphine Itching    Other Medication Hives     Polyester     Roxicet [Oxycodone-Acetaminophen] Itching    Vicodin [Hydrocodone-Acetaminophen] Itching     Itching was severe and she became confused. Social History     Tobacco Use    Smoking status: Never    Smokeless tobacco: Never    Tobacco comments:     no smoking within 24 hours of dos   Substance Use Topics    Alcohol use: No      Family History   Problem Relation Age of Onset    Cancer Maternal Grandmother     Seizures Maternal Grandmother     Migraines Maternal Grandmother     OSTEOARTHRITIS Maternal Grandmother     Arthritis-rheumatoid Maternal Grandmother     Thyroid Disease Maternal Grandmother     Lung Disease Maternal Grandmother     High Cholesterol Mother     Hypertension Mother     Elevated Lipids Mother     Heart Disease Father     Psychiatric Disorder Father     Hypertension Father     Diabetes Father     Cancer Paternal Grandmother         Review of Systems  A comprehensive review of systems was negative except for that written in the HPI.         Objective:   Patient Vitals for the past 8 hrs:   BP Temp Pulse Resp SpO2 Height Weight   22 0907 107/62 97.5 °F (36.4 °C) 84 18 100 % 5' 7\" (1.702 m) 96.3 kg (212 lb 4.8 oz)     Temp (24hrs), Av.5 °F (36.4 °C), Min:97.5 °F (36.4 °C), Max:97.5 °F (36.4 °C)      Gen: Well-developed,  in no acute distress   HEENT: Pink conjunctivae, PERRL, hearing intact to voice, moist mucous membranes   Neck: Supple, without masses, thyroid non-tender   Resp: No accessory muscle use, clear breath sounds without wheezes rales or rhonchi   Card: No murmurs, normal S1, S2 without thrills, bruits or peripheral edema   Abd: Soft, non-tender, non-distended, normoactive bowel sounds are present, no palpable organomegaly and no detectable hernias   Lymph: No cervical or inguinal adenopathy   Musc: able to reach overhead, pain with impingement testing, distally nvi   Skin: No skin breakdown noted. Skin warm, pink, dry  Neuro: Cranial nerves are grossly intact, no focal motor weakness, follows commands appropriately   Psych: Good insight, oriented to person, place and time, alert      Labs: No results found for this or any previous visit (from the past 24 hour(s)). Assessment:     Patient Active Problem List    Diagnosis Date Noted    Equinus contracture of ankle 12/10/2021    Plantar fasciitis 12/10/2021    Morbid obesity due to excess calories ([facility] [address] 04/03/2018    Vitamin D deficiency 04/28/2014    Osteoarthrosis 04/25/2014    Sleep apnea 04/25/2014    PCOS (polycystic ovarian syndrome) 04/25/2013         Plan:   Risks and benefits discussed at length. She understands and wishes to proceed with right shoulder arthroscopy. Proceed with surgical intervention without contraindications.       Roberto Hamilton MD

## 2022-08-24 NOTE — ANESTHESIA PREPROCEDURE EVALUATION
Relevant Problems   RESPIRATORY SYSTEM   (+) Sleep apnea      ENDOCRINE   (+) Morbid obesity due to excess calories (HCC)       Anesthetic History   No history of anesthetic complications     Pertinent negatives: No PONV       Review of Systems / Medical History  Patient summary reviewed, nursing notes reviewed and pertinent labs reviewed    Pulmonary              Pertinent negatives: No COPD, asthma, sleep apnea (Resolved) and smoker     Neuro/Psych         Psychiatric history  Pertinent negatives: No seizures and CVA   Cardiovascular            Dysrhythmias : SVT    Pertinent negatives: No hypertension and past MI  Exercise tolerance: >4 METS  Comments: Dora's anomaly s/p repair at age 9  Ablation for WPW at age 5   GI/Hepatic/Renal     GERD: well controlled        Pertinent negatives: No hiatal hernia, liver disease and renal disease   Endo/Other      Hypothyroidism  Obesity and arthritis  Pertinent negatives: No diabetes and morbid obesity   Other Findings              Physical Exam    Airway  Mallampati: I  TM Distance: > 6 cm  Neck ROM: normal range of motion   Mouth opening: Normal     Cardiovascular               Dental         Pulmonary                 Abdominal  GI exam deferred       Other Findings            Anesthetic Plan    ASA: 3  Anesthesia type: general      Post-op pain plan if not by surgeon: peripheral nerve block single    Induction: Intravenous  Anesthetic plan and risks discussed with: Patient

## 2022-08-24 NOTE — PERIOP NOTES
Reviewed PTA medication list with patient/caregiver and patient/caregiver denies any additional medications. Patient admits to having a responsible adult care for them at home for at least 24 hours after surgery. Patient encouraged to use gown warming system and informed that using said warming gown to regulate body temperature prior to a procedure has been shown to help reduce the risks of blood clots and infection. Patient's pharmacy of choice verified and documented in PTA medication section. Dual skin assessment & fall risk band verification completed with Janae Gooden RN.

## 2022-08-24 NOTE — ANESTHESIA POSTPROCEDURE EVALUATION
Post-Anesthesia Evaluation and Assessment    Cardiovascular Function/Vital Signs  Visit Vitals  BP (!) 109/59   Pulse 66   Temp 36.1 °C (97 °F)   Resp 16   Ht 5' 7\" (1.702 m)   Wt 96.3 kg (212 lb 4.8 oz)   SpO2 97%   BMI 33.25 kg/m²       Patient is status post Procedure(s):  LEFT SHOULDER ARTHROSCOPY WITH SUBACROMIAL DECOMPRESSION \"SPEC POP\". Nausea/Vomiting: Controlled. Postoperative hydration reviewed and adequate. Pain:  Pain Scale 1: Numeric (0 - 10) (08/24/22 1150)  Pain Intensity 1: 0 (08/24/22 1150)   Managed. Neurological Status:   Neuro (WDL): Exceptions to WDL (08/24/22 1150)   At baseline. Mental Status and Level of Consciousness: Arousable. Pulmonary Status:   O2 Device: None (Room air) (08/24/22 1150)   Adequate oxygenation and airway patent. Complications related to anesthesia: None    Post-anesthesia assessment completed. No concerns. Patient has met all discharge requirements.     Signed By: Tiff Costello CRNA    August 24, 2022

## 2023-02-03 DIAGNOSIS — M75.42 IMPINGEMENT SYNDROME OF LEFT SHOULDER: Primary | ICD-10-CM

## 2023-02-05 DIAGNOSIS — M75.42 IMPINGEMENT SYNDROME OF LEFT SHOULDER: Primary | ICD-10-CM

## 2023-10-03 ENCOUNTER — HOSPITAL ENCOUNTER (OUTPATIENT)
Facility: HOSPITAL | Age: 28
Discharge: HOME | End: 2023-10-05
Payer: MEDICAID

## 2023-10-03 ENCOUNTER — HOSPITAL ENCOUNTER (OUTPATIENT)
Facility: HOSPITAL | Age: 28
Discharge: HOME | End: 2023-10-06

## 2023-10-03 DIAGNOSIS — M50.20 DISPLACEMENT OF CERVICAL INTERVERTEBRAL DISC WITHOUT MYELOPATHY: ICD-10-CM

## 2023-10-03 LAB
EKG ATRIAL RATE: 72 BPM
EKG DIAGNOSIS: NORMAL
EKG P AXIS: 38 DEGREES
EKG P-R INTERVAL: 148 MS
EKG Q-T INTERVAL: 396 MS
EKG QRS DURATION: 110 MS
EKG QTC CALCULATION (BAZETT): 433 MS
EKG R AXIS: 57 DEGREES
EKG T AXIS: 38 DEGREES
EKG VENTRICULAR RATE: 72 BPM
LABCORP SPECIMEN COLLECTION: NORMAL

## 2023-10-03 PROCEDURE — 93005 ELECTROCARDIOGRAM TRACING: CPT

## 2023-10-09 NOTE — PERIOP NOTE
Spoke with Becky Flynn. Is moving  patient's surgery due to cardiology appointment is after this procedure and she is at moderate risk as of 2021.

## 2023-10-09 NOTE — H&P
Pre-Admission History and Physical    Patient: Shelley Gotti   MRN: [id number]   SSN: [id number]   YOB: 1995   Age: 28 y.o. Sex: female     Patient scheduled for: Cervical Disc Arthroplasty Cervical Five/Six. Date of surgery: 12/06/2023. Surgeon: Mali Borges MD    HPI:  Shelley Gotti is a 28 y.o. female with neck pain and radiating shoulder pain which disturbs her sleep and function. MRI demonstrates C5 right paracentral disc herniation spinal nerve root compression. She has experienced improvement in strength and numbness with conservative tx methods, however it has been several months and she does not feel adequately improved and struggles with sleep. She is being admitted for surgical intervention.          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

## 2023-10-09 NOTE — PERIOP NOTE
Requested last cardiac notes /Fax sent to Dr. Torri Blackmon on VM for Victorina Tipton re: cardiac notes

## 2023-11-20 RX ORDER — BUSPIRONE HYDROCHLORIDE 15 MG/1
15 TABLET ORAL 2 TIMES DAILY
COMMUNITY

## 2023-11-20 RX ORDER — ONDANSETRON 4 MG/1
4 TABLET, FILM COATED ORAL EVERY 8 HOURS PRN
COMMUNITY

## 2023-11-20 RX ORDER — METHOCARBAMOL 500 MG/1
500 TABLET, FILM COATED ORAL AS NEEDED
COMMUNITY

## 2023-11-20 RX ORDER — HYDROXYZINE PAMOATE 50 MG/1
50 CAPSULE ORAL
COMMUNITY

## 2023-11-20 NOTE — PERIOP NOTE
PAT Activity Status Questionnaire       Yes No Points for YES    Are you able to climb a flight of stairs or walk up a hill? [x] [] 1   Are you able to do heavy work around the house like lifting or moving heavy furniture? [] [x] 1   Do yardwork like raking leaves, weeding or pushing a power mower? [] [x] 1   Participate in strenuous sports like swimming, singles tennis, football, basketball or skiing? [] [x] 1   Total Score:      1       If TOTAL SCORE is <3, send chart for anesthesia review. No sleep apnea, removable prosthetic devices or family history of malignant hyperthermia. CHG wash x 3 days instructions reviewed. PCP is aware of the surgery. No participation in clinical trial or research study. Do not bring any valuables on DOS- jewelry, wallet, cash, laptop, medications. Does not meet criteria for special population at this time. Possible time delay day of surgery reviewed. Instructed to bring Insurance card and ID on the day of surgery. DNR status- none.

## 2023-12-05 ENCOUNTER — ANESTHESIA EVENT (OUTPATIENT)
Facility: HOSPITAL | Age: 28
End: 2023-12-05
Payer: MEDICAID

## 2023-12-06 ENCOUNTER — HOSPITAL ENCOUNTER (OUTPATIENT)
Facility: HOSPITAL | Age: 28
Setting detail: OUTPATIENT SURGERY
Discharge: HOME | End: 2023-12-06
Attending: ORTHOPAEDIC SURGERY | Admitting: ORTHOPAEDIC SURGERY
Payer: MEDICAID

## 2023-12-06 ENCOUNTER — ANESTHESIA (OUTPATIENT)
Facility: HOSPITAL | Age: 28
End: 2023-12-06
Payer: MEDICAID

## 2023-12-06 ENCOUNTER — APPOINTMENT (OUTPATIENT)
Facility: HOSPITAL | Age: 28
End: 2023-12-06
Attending: ORTHOPAEDIC SURGERY
Payer: MEDICAID

## 2023-12-06 VITALS
OXYGEN SATURATION: 100 % | HEIGHT: 67 IN | DIASTOLIC BLOOD PRESSURE: 78 MMHG | HEART RATE: 51 BPM | WEIGHT: 184.5 LBS | TEMPERATURE: 97.2 F | RESPIRATION RATE: 16 BRPM | SYSTOLIC BLOOD PRESSURE: 124 MMHG | BODY MASS INDEX: 28.96 KG/M2

## 2023-12-06 DIAGNOSIS — Z98.890 S/P CERVICAL DISC REPLACEMENT: Primary | ICD-10-CM

## 2023-12-06 LAB
ABO + RH BLD: NORMAL
BLOOD GROUP ANTIBODIES SERPL: NORMAL
HCG UR QL: NEGATIVE
SPECIMEN EXP DATE BLD: NORMAL

## 2023-12-06 PROCEDURE — 86850 RBC ANTIBODY SCREEN: CPT

## 2023-12-06 PROCEDURE — 6360000002 HC RX W HCPCS: Performed by: SPECIALIST

## 2023-12-06 PROCEDURE — L0120 CERV FLEX N/ADJ FOAM PRE OTS: HCPCS | Performed by: ORTHOPAEDIC SURGERY

## 2023-12-06 PROCEDURE — 3700000000 HC ANESTHESIA ATTENDED CARE: Performed by: ORTHOPAEDIC SURGERY

## 2023-12-06 PROCEDURE — 2580000003 HC RX 258: Performed by: ORTHOPAEDIC SURGERY

## 2023-12-06 PROCEDURE — 3600000012 HC SURGERY LEVEL 2 ADDTL 15MIN: Performed by: ORTHOPAEDIC SURGERY

## 2023-12-06 PROCEDURE — C1729 CATH, DRAINAGE: HCPCS | Performed by: ORTHOPAEDIC SURGERY

## 2023-12-06 PROCEDURE — 7100000000 HC PACU RECOVERY - FIRST 15 MIN: Performed by: ORTHOPAEDIC SURGERY

## 2023-12-06 PROCEDURE — 7100000010 HC PHASE II RECOVERY - FIRST 15 MIN: Performed by: ORTHOPAEDIC SURGERY

## 2023-12-06 PROCEDURE — 86900 BLOOD TYPING SEROLOGIC ABO: CPT

## 2023-12-06 PROCEDURE — 36415 COLL VENOUS BLD VENIPUNCTURE: CPT

## 2023-12-06 PROCEDURE — 81025 URINE PREGNANCY TEST: CPT

## 2023-12-06 PROCEDURE — C1889 IMPLANT/INSERT DEVICE, NOC: HCPCS | Performed by: ORTHOPAEDIC SURGERY

## 2023-12-06 PROCEDURE — 3700000001 HC ADD 15 MINUTES (ANESTHESIA): Performed by: ORTHOPAEDIC SURGERY

## 2023-12-06 PROCEDURE — 7100000011 HC PHASE II RECOVERY - ADDTL 15 MIN: Performed by: ORTHOPAEDIC SURGERY

## 2023-12-06 PROCEDURE — 6360000002 HC RX W HCPCS: Performed by: ORTHOPAEDIC SURGERY

## 2023-12-06 PROCEDURE — 77002 NEEDLE LOCALIZATION BY XRAY: CPT

## 2023-12-06 PROCEDURE — C1713 ANCHOR/SCREW BN/BN,TIS/BN: HCPCS | Performed by: ORTHOPAEDIC SURGERY

## 2023-12-06 PROCEDURE — 7100000001 HC PACU RECOVERY - ADDTL 15 MIN: Performed by: ORTHOPAEDIC SURGERY

## 2023-12-06 PROCEDURE — 2720000010 HC SURG SUPPLY STERILE: Performed by: ORTHOPAEDIC SURGERY

## 2023-12-06 PROCEDURE — 2709999900 HC NON-CHARGEABLE SUPPLY: Performed by: ORTHOPAEDIC SURGERY

## 2023-12-06 PROCEDURE — 3600000002 HC SURGERY LEVEL 2 BASE: Performed by: ORTHOPAEDIC SURGERY

## 2023-12-06 PROCEDURE — 2500000003 HC RX 250 WO HCPCS: Performed by: ORTHOPAEDIC SURGERY

## 2023-12-06 PROCEDURE — 6360000002 HC RX W HCPCS: Performed by: NURSE ANESTHETIST, CERTIFIED REGISTERED

## 2023-12-06 PROCEDURE — 86901 BLOOD TYPING SEROLOGIC RH(D): CPT

## 2023-12-06 PROCEDURE — 2500000003 HC RX 250 WO HCPCS: Performed by: NURSE ANESTHETIST, CERTIFIED REGISTERED

## 2023-12-06 RX ORDER — FENTANYL CITRATE 50 UG/ML
25 INJECTION, SOLUTION INTRAMUSCULAR; INTRAVENOUS EVERY 5 MIN PRN
Status: DISCONTINUED | OUTPATIENT
Start: 2023-12-06 | End: 2023-12-06 | Stop reason: HOSPADM

## 2023-12-06 RX ORDER — GLYCOPYRROLATE 0.2 MG/ML
INJECTION INTRAMUSCULAR; INTRAVENOUS PRN
Status: DISCONTINUED | OUTPATIENT
Start: 2023-12-06 | End: 2023-12-06 | Stop reason: SDUPTHER

## 2023-12-06 RX ORDER — LABETALOL HYDROCHLORIDE 5 MG/ML
10 INJECTION, SOLUTION INTRAVENOUS
Status: DISCONTINUED | OUTPATIENT
Start: 2023-12-06 | End: 2023-12-06 | Stop reason: HOSPADM

## 2023-12-06 RX ORDER — DIPHENHYDRAMINE HYDROCHLORIDE 50 MG/ML
12.5 INJECTION INTRAMUSCULAR; INTRAVENOUS
Status: DISCONTINUED | OUTPATIENT
Start: 2023-12-06 | End: 2023-12-06 | Stop reason: HOSPADM

## 2023-12-06 RX ORDER — NEOSTIGMINE METHYLSULFATE 1 MG/ML
INJECTION, SOLUTION INTRAVENOUS PRN
Status: DISCONTINUED | OUTPATIENT
Start: 2023-12-06 | End: 2023-12-06 | Stop reason: SDUPTHER

## 2023-12-06 RX ORDER — SODIUM CHLORIDE 0.9 % (FLUSH) 0.9 %
5-40 SYRINGE (ML) INJECTION PRN
Status: DISCONTINUED | OUTPATIENT
Start: 2023-12-06 | End: 2023-12-06 | Stop reason: HOSPADM

## 2023-12-06 RX ORDER — SODIUM CHLORIDE, SODIUM LACTATE, POTASSIUM CHLORIDE, CALCIUM CHLORIDE 600; 310; 30; 20 MG/100ML; MG/100ML; MG/100ML; MG/100ML
INJECTION, SOLUTION INTRAVENOUS CONTINUOUS
Status: DISCONTINUED | OUTPATIENT
Start: 2023-12-06 | End: 2023-12-06 | Stop reason: HOSPADM

## 2023-12-06 RX ORDER — OXYCODONE HYDROCHLORIDE 5 MG/1
5 TABLET ORAL EVERY 6 HOURS PRN
Qty: 28 TABLET | Refills: 0 | Status: SHIPPED | OUTPATIENT
Start: 2023-12-06 | End: 2023-12-13

## 2023-12-06 RX ORDER — DIPHENHYDRAMINE HCL 25 MG
25 CAPSULE ORAL EVERY 6 HOURS PRN
OUTPATIENT
Start: 2023-12-06

## 2023-12-06 RX ORDER — PROPOFOL 10 MG/ML
INJECTION, EMULSION INTRAVENOUS PRN
Status: DISCONTINUED | OUTPATIENT
Start: 2023-12-06 | End: 2023-12-06 | Stop reason: SDUPTHER

## 2023-12-06 RX ORDER — FENTANYL CITRATE 50 UG/ML
INJECTION, SOLUTION INTRAMUSCULAR; INTRAVENOUS PRN
Status: DISCONTINUED | OUTPATIENT
Start: 2023-12-06 | End: 2023-12-06 | Stop reason: SDUPTHER

## 2023-12-06 RX ORDER — INDOCYANINE GREEN AND WATER 25 MG
KIT INJECTION PRN
Status: DISCONTINUED | OUTPATIENT
Start: 2023-12-06 | End: 2023-12-06 | Stop reason: ALTCHOICE

## 2023-12-06 RX ORDER — ONDANSETRON 2 MG/ML
4 INJECTION INTRAMUSCULAR; INTRAVENOUS
Status: DISCONTINUED | OUTPATIENT
Start: 2023-12-06 | End: 2023-12-06 | Stop reason: HOSPADM

## 2023-12-06 RX ORDER — SODIUM CHLORIDE 9 MG/ML
INJECTION, SOLUTION INTRAVENOUS PRN
Status: DISCONTINUED | OUTPATIENT
Start: 2023-12-06 | End: 2023-12-06 | Stop reason: HOSPADM

## 2023-12-06 RX ORDER — MIDAZOLAM HYDROCHLORIDE 1 MG/ML
INJECTION INTRAMUSCULAR; INTRAVENOUS PRN
Status: DISCONTINUED | OUTPATIENT
Start: 2023-12-06 | End: 2023-12-06 | Stop reason: SDUPTHER

## 2023-12-06 RX ORDER — SODIUM CHLORIDE 0.9 % (FLUSH) 0.9 %
5-40 SYRINGE (ML) INJECTION EVERY 12 HOURS SCHEDULED
Status: DISCONTINUED | OUTPATIENT
Start: 2023-12-06 | End: 2023-12-06 | Stop reason: HOSPADM

## 2023-12-06 RX ORDER — ROCURONIUM BROMIDE 10 MG/ML
INJECTION, SOLUTION INTRAVENOUS PRN
Status: DISCONTINUED | OUTPATIENT
Start: 2023-12-06 | End: 2023-12-06 | Stop reason: SDUPTHER

## 2023-12-06 RX ORDER — VANCOMYCIN HYDROCHLORIDE 1 G/20ML
INJECTION, POWDER, LYOPHILIZED, FOR SOLUTION INTRAVENOUS PRN
Status: DISCONTINUED | OUTPATIENT
Start: 2023-12-06 | End: 2023-12-06 | Stop reason: ALTCHOICE

## 2023-12-06 RX ORDER — DROPERIDOL 2.5 MG/ML
0.62 INJECTION, SOLUTION INTRAMUSCULAR; INTRAVENOUS
Status: DISCONTINUED | OUTPATIENT
Start: 2023-12-06 | End: 2023-12-06 | Stop reason: HOSPADM

## 2023-12-06 RX ORDER — LIDOCAINE HYDROCHLORIDE 20 MG/ML
INJECTION, SOLUTION EPIDURAL; INFILTRATION; INTRACAUDAL; PERINEURAL PRN
Status: DISCONTINUED | OUTPATIENT
Start: 2023-12-06 | End: 2023-12-06 | Stop reason: SDUPTHER

## 2023-12-06 RX ORDER — EPHEDRINE SULFATE/0.9% NACL/PF 50 MG/5 ML
SYRINGE (ML) INTRAVENOUS PRN
Status: DISCONTINUED | OUTPATIENT
Start: 2023-12-06 | End: 2023-12-06 | Stop reason: SDUPTHER

## 2023-12-06 RX ORDER — ONDANSETRON 2 MG/ML
INJECTION INTRAMUSCULAR; INTRAVENOUS PRN
Status: DISCONTINUED | OUTPATIENT
Start: 2023-12-06 | End: 2023-12-06 | Stop reason: SDUPTHER

## 2023-12-06 RX ADMIN — Medication 10 MG: at 07:51

## 2023-12-06 RX ADMIN — SODIUM CHLORIDE, SODIUM LACTATE, POTASSIUM CHLORIDE, AND CALCIUM CHLORIDE: 600; 310; 30; 20 INJECTION, SOLUTION INTRAVENOUS at 06:33

## 2023-12-06 RX ADMIN — LIDOCAINE HYDROCHLORIDE 80 MG: 20 INJECTION, SOLUTION EPIDURAL; INFILTRATION; INTRACAUDAL; PERINEURAL at 07:32

## 2023-12-06 RX ADMIN — FENTANYL CITRATE 100 MCG: 50 INJECTION, SOLUTION INTRAMUSCULAR; INTRAVENOUS at 07:32

## 2023-12-06 RX ADMIN — MIDAZOLAM 4 MG: 1 INJECTION INTRAMUSCULAR; INTRAVENOUS at 07:27

## 2023-12-06 RX ADMIN — PROPOFOL 200 MG: 10 INJECTION, EMULSION INTRAVENOUS at 07:33

## 2023-12-06 RX ADMIN — FENTANYL CITRATE 25 MCG: 50 INJECTION INTRAMUSCULAR; INTRAVENOUS at 09:34

## 2023-12-06 RX ADMIN — Medication 10 MG: at 07:47

## 2023-12-06 RX ADMIN — FENTANYL CITRATE 50 MCG: 50 INJECTION, SOLUTION INTRAMUSCULAR; INTRAVENOUS at 07:59

## 2023-12-06 RX ADMIN — ROCURONIUM BROMIDE 40 MG: 10 INJECTION, SOLUTION INTRAVENOUS at 07:34

## 2023-12-06 RX ADMIN — FENTANYL CITRATE 25 MCG: 50 INJECTION INTRAMUSCULAR; INTRAVENOUS at 09:26

## 2023-12-06 RX ADMIN — GLYCOPYRROLATE 0.4 MG: 0.2 INJECTION INTRAMUSCULAR; INTRAVENOUS at 08:56

## 2023-12-06 RX ADMIN — FENTANYL CITRATE 50 MCG: 50 INJECTION, SOLUTION INTRAMUSCULAR; INTRAVENOUS at 08:56

## 2023-12-06 RX ADMIN — ONDANSETRON 4 MG: 2 INJECTION INTRAMUSCULAR; INTRAVENOUS at 07:29

## 2023-12-06 RX ADMIN — Medication 3 MG: at 08:56

## 2023-12-06 RX ADMIN — WATER 2000 MG: 1 INJECTION INTRAMUSCULAR; INTRAVENOUS; SUBCUTANEOUS at 07:45

## 2023-12-06 RX ADMIN — GLYCOPYRROLATE 0.2 MG: 0.2 INJECTION INTRAMUSCULAR; INTRAVENOUS at 07:29

## 2023-12-06 RX ADMIN — SODIUM CHLORIDE, SODIUM LACTATE, POTASSIUM CHLORIDE, AND CALCIUM CHLORIDE: 600; 310; 30; 20 INJECTION, SOLUTION INTRAVENOUS at 07:52

## 2023-12-06 RX ADMIN — Medication 10 MG: at 07:39

## 2023-12-06 ASSESSMENT — PAIN DESCRIPTION - DESCRIPTORS
DESCRIPTORS: SORE
DESCRIPTORS: ACHING;SHARP

## 2023-12-06 ASSESSMENT — PAIN - FUNCTIONAL ASSESSMENT
PAIN_FUNCTIONAL_ASSESSMENT: 0-10
PAIN_FUNCTIONAL_ASSESSMENT: NONE - DENIES PAIN
PAIN_FUNCTIONAL_ASSESSMENT: ADULT NONVERBAL PAIN SCALE (NPVS)
PAIN_FUNCTIONAL_ASSESSMENT: 0-10
PAIN_FUNCTIONAL_ASSESSMENT: 0-10
PAIN_FUNCTIONAL_ASSESSMENT: ACTIVITIES ARE NOT PREVENTED
PAIN_FUNCTIONAL_ASSESSMENT: 0-10

## 2023-12-06 ASSESSMENT — PAIN DESCRIPTION - FREQUENCY: FREQUENCY: INTERMITTENT

## 2023-12-06 ASSESSMENT — PAIN DESCRIPTION - LOCATION
LOCATION: NECK
LOCATION: INCISION

## 2023-12-06 ASSESSMENT — PAIN SCALES - GENERAL
PAINLEVEL_OUTOF10: 0
PAINLEVEL_OUTOF10: 4
PAINLEVEL_OUTOF10: 4
PAINLEVEL_OUTOF10: 5
PAINLEVEL_OUTOF10: 0
PAINLEVEL_OUTOF10: 0

## 2023-12-06 ASSESSMENT — PAIN DESCRIPTION - ORIENTATION: ORIENTATION: POSTERIOR

## 2023-12-06 ASSESSMENT — PAIN DESCRIPTION - PAIN TYPE: TYPE: SURGICAL PAIN

## 2023-12-06 NOTE — PERIOP NOTE
Arrived to Phase 2 - alert and oriented - assisted to recliner -  strong bilaterally, good sensation, radial pulses strong - surgical dressing clean , dry and intact - soft collar in place

## 2023-12-06 NOTE — DISCHARGE INSTRUCTIONS
are important for maintaining a healthy lifestyle    You may be retaining fluid if you have a history of heart failure or if you experience any of the following symptoms:  Weight gain of 3 pounds or more overnight or 5 pounds in a week, increased swelling in our hands or feet or shortness of breath while lying flat in bed. Please call your doctor as soon as you notice any of these symptoms; do not wait until your next office visit. Patient armband removed and shredded     The discharge information has been reviewed with the patient and caregiver. The patient and caregiver verbalized understanding. Discharge medications reviewed with the patient and caregiver and appropriate educational materials and side effects teaching were provided.   ___________________________________________________________________________________________________________________________________

## 2023-12-06 NOTE — PERIOP NOTE
Discharge instructions reviewed with patient and family. Opportunity for questions and answers given. No further questions at this time.    Tolerating applesauce and gingerale - denies difficulty swallowing

## 2023-12-06 NOTE — PERIOP NOTE
TRANSFER - IN REPORT:    Verbal report received from OR  on [facility]  being received from OR for routine post-op      Report consisted of patient's Situation, Background, Assessment and   Recommendations(SBAR). Information from the following report(s) Nurse Handoff Report, Adult Overview, Surgery Report, Intake/Output, Recent Results, and Cardiac Rhythm NSR  was reviewed with the receiving nurse. Opportunity for questions and clarification was provided. Assessment completed upon patient's arrival to unit and care assumed.

## 2023-12-06 NOTE — INTERVAL H&P NOTE
Update History & Physical    The patient's History and Physical of October 8, 2023 was reviewed with the patient and I examined the patient. There was no change. The surgical site was confirmed by the patient and me. Plan: The risks, benefits, expected outcome, and alternative to the recommended procedure have been discussed with the patient. Patient understands and wants to proceed with the procedure.      Electronically signed by Kedar Alan MD on 12/6/2023 at 7:17 AM

## 2023-12-06 NOTE — PERIOP NOTE
Assisted to BR - voided without difficulty - completed container of applesauce without swallowing difficulties - vss - will plan for discharge

## 2023-12-06 NOTE — PERIOP NOTE
TRANSFER - IN REPORT:    Verbal report received from Sung Olivera RN on [facility]  being received from PACU for routine post-op      Report consisted of patient's Situation, Background, Assessment and   Recommendations(SBAR). Information from the following report(s) Nurse Handoff Report, Adult Overview, Surgery Report, Intake/Output, and MAR was reviewed with the receiving nurse. Opportunity for questions and clarification was provided. Assessment completed upon patient's arrival to unit and care assumed.

## 2023-12-06 NOTE — PERIOP NOTE
TRANSFER - OUT REPORT:    Verbal report given to [address] [place] [address]  being transferred to Phase 2  for routine post-op       Report consisted of patient's Situation, Background, Assessment and   Recommendations(SBAR). Information from the following report(s) Nurse Handoff Report, Adult Overview, Surgery Report, Intake/Output, MAR, and Cardiac Rhythm NSR  was reviewed with the receiving nurse. Lines:   Peripheral IV 12/06/23 Posterior;Right Hand (Active)   Site Assessment Clean, dry & intact 12/06/23 0931   Line Status Blood return noted 12/06/23 0931   Phlebitis Assessment No symptoms 12/06/23 0931   Infiltration Assessment 0 12/06/23 0931   Dressing Status Clean, dry & intact 12/06/23 0931        Opportunity for questions and clarification was provided.       Patient transported with:  Registered Nurse

## (undated) DEVICE — SUT ETHLN 3-0 18IN PS2 BLK --

## (undated) DEVICE — PACK PROCEDURE SURG KNEE ARTHSCP CUST

## (undated) DEVICE — BLADELESS OPTICAL TROCAR WITH FIXATION CANNULA: Brand: VERSAPORT

## (undated) DEVICE — IMPLANTABLE DEVICE: Type: IMPLANTABLE DEVICE | Site: SPINE CERVICAL | Status: NON-FUNCTIONAL

## (undated) DEVICE — GARMENT,MEDLINE,DVT,INT,CALF,MED, GEN2: Brand: MEDLINE

## (undated) DEVICE — STRIP SKIN CLSR W0.25XL4IN WHT SPUNBOUND FBR NYL HI ADH

## (undated) DEVICE — ZIMMER® STERILE DISPOSABLE TOURNIQUET CUFF WITH PROTECTIVE SLEEVE AND PLC, SINGLE PORT, SINGLE BLADDER, 18 IN. (46 CM)

## (undated) DEVICE — SUTURE VCRL SZ 2-0 L54IN ABSRB VLT W/O NDL POLYGLACTIN 910 J618H

## (undated) DEVICE — GLOVE ORANGE PI 8 1/2   MSG9085

## (undated) DEVICE — GLOVE SURG SZ 85 L12IN FNGR THK79MIL GRN LTX FREE

## (undated) DEVICE — STAPLE INT BIOABSORABLE LN REINF 29 PROX 29 ETHICON SEAMGRD

## (undated) DEVICE — SCREW DISTRACTOR L14MM MAXCESS-C

## (undated) DEVICE — SOLUTION IRRIG 500ML 0.9% SOD CHLO USP POUR PLAS BTL

## (undated) DEVICE — ELECTRODE RF DIA4MM 90DEG SUCT W/ INTEGR HNDPC VAPR S90

## (undated) DEVICE — DISPOSABLE SUCTION/IRRIGATOR TUBE SET WITH TIP: Brand: AHTO

## (undated) DEVICE — DYONICS 5.5 FULL RADIUS BONECUTTER                                    BLADES, ORANGE, 8000 MAXIMUM RPM,                                    PACKAGED 6 PER BOX, STERILE

## (undated) DEVICE — TUBING, SUCTION, 1/4" X 12', STRAIGHT: Brand: MEDLINE

## (undated) DEVICE — SUTURE FIBERWIRE SZ 2 W/ TAPERED NEEDLE BLUE L38IN NONABSORB BLU L26.5MM 1/2 CIRCLE AR7200

## (undated) DEVICE — STRAP,POSITIONING,KNEE/BODY,FOAM,4X60": Brand: MEDLINE

## (undated) DEVICE — APPLICATOR MEDICATED 26 CC SOLUTION HI LT ORNG CHLORAPREP

## (undated) DEVICE — BLADELESS OPTICAL TROCAR WITH FIXATION CANNULA: Brand: VERSAONE

## (undated) DEVICE — VISUALIZATION SYSTEM: Brand: CLEARIFY

## (undated) DEVICE — PACK PROCEDURE SURG EXTREMITY CUST

## (undated) DEVICE — SHEET,DRAPE,70X100,STERILE: Brand: MEDLINE

## (undated) DEVICE — SUTURE ETHLN SZ 4-0 L18IN NONABSORBABLE BLK L19MM PS-2 3/8 1667H

## (undated) DEVICE — INTENDED FOR TISSUE SEPARATION, AND OTHER PROCEDURES THAT REQUIRE A SHARP SURGICAL BLADE TO PUNCTURE OR CUT.: Brand: BARD-PARKER ® CARBON RIB-BACK BLADES

## (undated) DEVICE — SPONGE GZ W4XL4IN COT 12 PLY TYP VII WVN C FLD DSGN

## (undated) DEVICE — RESERVOIR,SUCTION,100CC,SILICONE: Brand: MEDLINE

## (undated) DEVICE — DERMABOND SKIN ADH 0.7ML -- DERMABOND ADVANCED 12/BX

## (undated) DEVICE — RELOAD STPL L60MM H1-2.6MM MESENTERY THN TISS WHT 6 ROW

## (undated) DEVICE — SUT MONOCRYL PLUS UD 4-0 --

## (undated) DEVICE — ELECTRODE PT RET AD L9FT HI MOIST COND ADH HYDRGEL CORDED

## (undated) DEVICE — SUTURE VCRL + SZ 2-0 L27IN ABSRB UD CT-2 L26MM 1/2 CIR TAPR VCP269H

## (undated) DEVICE — ANTERIOR CERVICAL POSITIONING SYSTEM SINGLE USE KIT BOX OF 5: Brand: BONEFOAM

## (undated) DEVICE — BANDAGE,GAUZE,CONFORMING,2"X75",STRL,LF: Brand: MEDLINE INDUSTRIES, INC.

## (undated) DEVICE — KIT CATH OD16FR 5ML BLLN SIL URIN INDWL STR TIP INF CTRL

## (undated) DEVICE — SUTURE MCRYL SZ 3-0 L27IN ABSRB UD PS-2 3/8 CIR REV CUT NDL MCP427H

## (undated) DEVICE — RELOAD STPL L60MM H1.5-3.6MM REG TISS BLU GRIPPING SURF B

## (undated) DEVICE — COLLAR CERV L H3XL18IN COT M DENS FOAM BRTH ADJ LO-CONTOUR

## (undated) DEVICE — SUTURE MCRYL SZ 3-0 L27IN ABSRB UD L26MM SH 1/2 CIR Y416H

## (undated) DEVICE — INTENDED FOR TISSUE SEPARATION, AND OTHER PROCEDURES THAT REQUIRE A SHARP SURGICAL BLADE TO PUNCTURE OR CUT.: Brand: BARD-PARKER SAFETY BLADES SIZE 10, STERILE

## (undated) DEVICE — GOWN,SIRUS,NONRNF,SETINSLV,2XL,18/CS: Brand: MEDLINE

## (undated) DEVICE — SYRINGE CATH TIP 50ML

## (undated) DEVICE — REM POLYHESIVE ADULT PATIENT RETURN ELECTRODE: Brand: VALLEYLAB

## (undated) DEVICE — PACK PROCEDURE SURG LAPAROSCOPY 17X7 MM BRTRC PRIMUS

## (undated) DEVICE — BNDG,ELSTC,MATRIX,STRL,6"X5YD,LF,HOOK&LP: Brand: MEDLINE

## (undated) DEVICE — DRESSING PETRO W3XL8IN N ADH OIL EMUL GZ CURAD

## (undated) DEVICE — 3.0MM PRECISION NEURO (MATCH HEAD)

## (undated) DEVICE — UNIVERSAL FIXATION CANNULA: Brand: VERSAONE

## (undated) DEVICE — GLOVE ORANGE PI 8   MSG9080

## (undated) DEVICE — PACK PROCEDURE SURG ANTR CERV DISCECTOMY

## (undated) DEVICE — TUBE IRRIG L8IN LNG PT W/ CONN FOR PMP SYS REDEUCE

## (undated) DEVICE — SUT MONOCRYL PLUS UD 3-0 --

## (undated) DEVICE — SOLUTION IRRIG 3000ML LAC R FLX CONT

## (undated) DEVICE — DRESSING,GAUZE,XEROFORM,CURAD,1"X8",ST: Brand: CURAD

## (undated) DEVICE — SURGIFOAM SPNG SZ 100

## (undated) DEVICE — MARKER,SKIN,WI/RULER AND LABELS: Brand: MEDLINE

## (undated) DEVICE — LIGHT HANDLE: Brand: DEVON

## (undated) DEVICE — BANDAGE COMPR W4INXL10YD WHITE/BEIGE E MTRX HK LOOP CLSR

## (undated) DEVICE — SUTURE MCRYL SZ 4-0 L27IN ABSRB UD L24MM PS-1 3/8 CIR PRIM Y935H

## (undated) DEVICE — PREP SKN CHLRAPRP APL 26ML STR --

## (undated) DEVICE — DRAPE,SHOULDER,BEACH CHAIR,STERILE: Brand: MEDLINE

## (undated) DEVICE — CONTAINER,SPECIMEN,STRL PATH,4.5OZ: Brand: MEDLINE

## (undated) DEVICE — STAPLER SKIN L440MM 32MM LNG 12 FIRING B FRM PWR + GRIPPING

## (undated) DEVICE — KENDALL SCD EXPRESS SLEEVES, KNEE LENGTH, MEDIUM: Brand: KENDALL SCD

## (undated) DEVICE — SHEARS: Brand: ENDO SHEARS

## (undated) DEVICE — ADHESIVE SKIN CLOSURE WND 8.661X1.5 IN 22 CM LIQUIBAND SECUR

## (undated) DEVICE — GOWN,AURORA,NONRNF,XL,30/CS: Brand: MEDLINE

## (undated) DEVICE — SYRINGE MEDICAL 3ML CLEAR PLASTIC STANDARD NON CONTROL LUERLOCK TIP DISPOSABLE

## (undated) DEVICE — WATERPROOF, BACTERIA PROOF DRESSING WITH ABSORBENT SEE THROUGH PAD: Brand: OPSITE POST-OP VISIBLE 15X10CM CTN 20

## (undated) DEVICE — PADDING CAST W4INXL4YD ST COT COHESIVE HND TEARABLE SPEC

## (undated) DEVICE — (D)CANN ENDOSCP BLDELSS 12MM -- DISC BY MFR USE ITEM 332253

## (undated) DEVICE — SOLUTION IV STRL H2O 500 ML AQUALITE POUR BTL

## (undated) DEVICE — INSUFFLATION NEEDLE: Brand: SURGINEEDLE

## (undated) DEVICE — SUT SLK 2-0SH 30IN BLK --

## (undated) DEVICE — TRUE CONTENT TO BE POPULATED AS PART OF REBRANDING: Brand: ARGYLE

## (undated) DEVICE — DRAIN SURG W7MMXL20CM SIL FULL PERF HUBLESS FLAT RADPQ STRP

## (undated) DEVICE — SUTURE VCRL SZ 3-0 L27IN ABSRB VLT L26MM SH 1/2 CIR J316H

## (undated) DEVICE — 3M™ COBAN™ NL STERILE NON-LATEX SELF-ADHERENT WRAP, 2086S, 6 IN X 5 YD (15 CM X 4,5 M), 12 ROLLS/CASE: Brand: 3M™ COBAN™